# Patient Record
Sex: FEMALE | Race: WHITE
[De-identification: names, ages, dates, MRNs, and addresses within clinical notes are randomized per-mention and may not be internally consistent; named-entity substitution may affect disease eponyms.]

---

## 2017-10-15 ENCOUNTER — HOSPITAL ENCOUNTER (EMERGENCY)
Dept: HOSPITAL 62 - ER | Age: 82
Discharge: HOME | End: 2017-10-15
Payer: MEDICARE

## 2017-10-15 VITALS — DIASTOLIC BLOOD PRESSURE: 72 MMHG | SYSTOLIC BLOOD PRESSURE: 109 MMHG

## 2017-10-15 DIAGNOSIS — F41.9: Primary | ICD-10-CM

## 2017-10-15 DIAGNOSIS — R53.81: ICD-10-CM

## 2017-10-15 DIAGNOSIS — E78.00: ICD-10-CM

## 2017-10-15 DIAGNOSIS — Z88.6: ICD-10-CM

## 2017-10-15 DIAGNOSIS — R53.1: ICD-10-CM

## 2017-10-15 DIAGNOSIS — M19.90: ICD-10-CM

## 2017-10-15 DIAGNOSIS — E11.9: ICD-10-CM

## 2017-10-15 DIAGNOSIS — E87.6: ICD-10-CM

## 2017-10-15 DIAGNOSIS — I10: ICD-10-CM

## 2017-10-15 DIAGNOSIS — Z88.2: ICD-10-CM

## 2017-10-15 LAB
ALBUMIN SERPL-MCNC: 4 G/DL (ref 3.5–5)
ALP SERPL-CCNC: 67 U/L (ref 38–126)
ALT SERPL-CCNC: 26 U/L (ref 9–52)
ANION GAP SERPL CALC-SCNC: 14 MMOL/L (ref 5–19)
APPEARANCE UR: CLEAR
AST SERPL-CCNC: 38 U/L (ref 14–36)
BASOPHILS # BLD AUTO: 0 10^3/UL (ref 0–0.2)
BASOPHILS NFR BLD AUTO: 0.4 % (ref 0–2)
BILIRUB DIRECT SERPL-MCNC: 0.4 MG/DL (ref 0–0.4)
BILIRUB SERPL-MCNC: 0.9 MG/DL (ref 0.2–1.3)
BILIRUB UR QL STRIP: NEGATIVE
BUN SERPL-MCNC: 25 MG/DL (ref 7–20)
CALCIUM: 9.7 MG/DL (ref 8.4–10.2)
CHLORIDE SERPL-SCNC: 95 MMOL/L (ref 98–107)
CO2 SERPL-SCNC: 35 MMOL/L (ref 22–30)
CREAT SERPL-MCNC: 1.25 MG/DL (ref 0.52–1.25)
EOSINOPHIL # BLD AUTO: 0 10^3/UL (ref 0–0.6)
EOSINOPHIL NFR BLD AUTO: 0.5 % (ref 0–6)
ERYTHROCYTE [DISTWIDTH] IN BLOOD BY AUTOMATED COUNT: 13.5 % (ref 11.5–14)
GLUCOSE SERPL-MCNC: 97 MG/DL (ref 75–110)
GLUCOSE UR STRIP-MCNC: NEGATIVE MG/DL
HCT VFR BLD CALC: 38 % (ref 36–47)
HGB BLD-MCNC: 13.4 G/DL (ref 12–15.5)
HGB HCT DIFFERENCE: 2.2
KETONES UR STRIP-MCNC: NEGATIVE MG/DL
LYMPHOCYTES # BLD AUTO: 1.7 10^3/UL (ref 0.5–4.7)
LYMPHOCYTES NFR BLD AUTO: 24.3 % (ref 13–45)
MCH RBC QN AUTO: 31.5 PG (ref 27–33.4)
MCHC RBC AUTO-ENTMCNC: 35.3 G/DL (ref 32–36)
MCV RBC AUTO: 89 FL (ref 80–97)
MONOCYTES # BLD AUTO: 0.3 10^3/UL (ref 0.1–1.4)
MONOCYTES NFR BLD AUTO: 5 % (ref 3–13)
NEUTROPHILS # BLD AUTO: 4.8 10^3/UL (ref 1.7–8.2)
NEUTS SEG NFR BLD AUTO: 69.8 % (ref 42–78)
NITRITE UR QL STRIP: NEGATIVE
PH UR STRIP: 5 [PH] (ref 5–9)
POTASSIUM SERPL-SCNC: 3.1 MMOL/L (ref 3.6–5)
PROT SERPL-MCNC: 6.9 G/DL (ref 6.3–8.2)
PROT UR STRIP-MCNC: NEGATIVE MG/DL
RBC # BLD AUTO: 4.26 10^6/UL (ref 3.72–5.28)
SODIUM SERPL-SCNC: 143.7 MMOL/L (ref 137–145)
SP GR UR STRIP: 1
UROBILINOGEN UR-MCNC: NEGATIVE MG/DL (ref ?–2)
WBC # BLD AUTO: 6.9 10^3/UL (ref 4–10.5)

## 2017-10-15 PROCEDURE — 81001 URINALYSIS AUTO W/SCOPE: CPT

## 2017-10-15 PROCEDURE — 99284 EMERGENCY DEPT VISIT MOD MDM: CPT

## 2017-10-15 PROCEDURE — 83735 ASSAY OF MAGNESIUM: CPT

## 2017-10-15 PROCEDURE — 36415 COLL VENOUS BLD VENIPUNCTURE: CPT

## 2017-10-15 PROCEDURE — 84443 ASSAY THYROID STIM HORMONE: CPT

## 2017-10-15 PROCEDURE — 80053 COMPREHEN METABOLIC PANEL: CPT

## 2017-10-15 PROCEDURE — 85025 COMPLETE CBC W/AUTO DIFF WBC: CPT

## 2017-10-15 NOTE — ER DOCUMENT REPORT
ED General





- General


Mode of Arrival: Ambulatory - with walker


Information source: Patient


TRAVEL OUTSIDE OF THE [place] IN LAST 30 DAYS: No





- HPI


Associated symptoms: Other - see HPI





<LYDIA DURAND - Last Filed: 10/15/17 12:11>





<TUNG NEELY - Last Filed: 11/23/17 06:33>





- General


Chief Complaint: Weakness


Stated Complaint: WEAKNESS


Time Seen by Provider: 10/15/17 11:51


Notes: 


Patient is an 82 year old female who presents to the ED with complaints of 

worsening arthritic pain, decline in ability to do daily activities without 

assistance and increased forgetfulness.  This has all been declining over the 

last several months.  She also complains of "shaking in her chest" and anxiety.

  Her PCP is aware of these symptoms and her next appointment with him is in 

November. 


 (LYDIA DURAND)





- Related Data


Allergies/Adverse Reactions: 


 





codeine [Codeine] Allergy (Verified 10/15/17 10:38)


 


doxycycline [Doxycycline] Allergy (Verified 10/15/17 10:38)


 


Sulfa (Sulfonamide Antibiotics) Allergy (Verified 10/15/17 10:38)


 








Home Medications: 


 Current Home Medications





Esomeprazole Magnesium [Nexium] 40 mg PO DAILY 10/15/17 [History]


Hydrochlorothiazide [Hydrochlorothiazide] 25 mg PO DAILY 10/15/17 [History]


Olopatadine HCl [Pataday] 1 drop OU DAILY 10/15/17 [History]


Risperidone [Risperdal] 0.5 mg PO BID 10/15/17 [History]


Rosuvastatin Calcium [Rosuvastatin Calcium] 20 mg PO DAILY 10/15/17 [History]











Past Medical History





- General


Information source: Patient





- Social History


Smoking Status: Never Smoker


Chew tobacco use (# tins/day): No


Frequency of alcohol use: None


Drug Abuse: None


Family History: Reviewed & Not Pertinent





- Past Medical History


Cardiac Medical History: Reports: Hx Hypercholesterolemia, Hx Hypertension


Pulmonary Medical History: Reports: Hx Asthma


Endocrine Medical History: Reports: Hx Diabetes Mellitus Type 2


Renal/ Medical History: Denies: Hx Peritoneal Dialysis


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


Psychiatric Medical History: Reports: Hx Anxiety





- Immunizations


Hx Diphtheria, Pertussis, Tetanus Vaccination: Yes





<LYDIA DURAND - Last Filed: 10/15/17 12:11>





Review of Systems





- Review of Systems


Constitutional: See HPI, Weakness


EENT: No symptoms reported


Cardiovascular: No symptoms reported


Respiratory: No symptoms reported


Gastrointestinal: No symptoms reported


Genitourinary: No symptoms reported


Female Genitourinary: No symptoms reported


Musculoskeletal: See HPI, Other - worsening arthritic pain


Skin: No symptoms reported


Hematologic/Lymphatic: No symptoms reported


Neurological/Psychological: See HPI, Anxiety, Weakness, Other - forgetful





<LYDIA DURAND - Last Filed: 10/15/17 12:11>





Physical Exam





- General


General appearance: Appears well, Alert, Other - expressionless face


In distress: None





- HEENT


Head: Normocephalic, Atraumatic


Eyes: Normal, Tears - right eye


Extraocular movements intact: Yes


Pupils: PERRL





- Respiratory


Respiratory status: No respiratory distress


Breath sounds: Normal





- Cardiovascular


Rhythm: Regular


Heart sounds: Normal auscultation


Murmur: No





- Abdominal


Inspection: Normal


Distension: No distension


Tenderness: Nontender





- Back


Back: Normal





- Extremities


General upper extremity: Normal inspection, Normal strength


General lower extremity: Normal inspection, Normal strength





- Neurological


Neuro grossly intact: Yes





- Psychological


Associated symptoms: Normal affect, Normal mood





- Skin


Skin Temperature: Warm


Skin Moisture: Dry


Skin Color: Normal





<LYDIA DURAND - Last Filed: 10/15/17 12:11>





- Vital signs


Vitals: 


 











Temp Pulse Resp BP Pulse Ox


 


 98.4 F   89   16   109/72   94 


 


 10/15/17 10:38  10/15/17 10:38  10/15/17 10:38  10/15/17 10:38  10/15/17 10:38














Course





- Laboratory


Result Diagrams: 


 10/15/17 13:02





 10/15/17 13:02





<TUNG NEELY - Last Filed: 11/23/17 06:33>





- Vital Signs


Vital signs: 


 











Temp Pulse Resp BP Pulse Ox


 


 98.4 F   89   16   109/72   94 


 


 10/15/17 10:38  10/15/17 10:38  10/15/17 10:38  10/15/17 10:38  10/15/17 10:38














- Laboratory


Laboratory results interpreted by me: 


 











  10/15/17 10/15/17 10/15/17





  13:02 13:02 13:02


 


Plt Count  147 L  


 


Potassium   3.1 L 


 


Chloride   95 L 


 


Carbon Dioxide   35 H 


 


BUN   25 H 


 


Est GFR ( Amer)   50 L 


 


Est GFR (Non-Af Amer)   41 L 


 


Magnesium   


 


AST   38 H 


 


Urine Blood    SMALL H














  10/15/17





  13:02


 


Plt Count 


 


Potassium 


 


Chloride 


 


Carbon Dioxide 


 


BUN 


 


Est GFR ( Amer) 


 


Est GFR (Non-Af Amer) 


 


Magnesium  1.5 L


 


AST 


 


Urine Blood 














Discharge





<LYDIA DURAND - Last Filed: 10/15/17 12:11>





<TUNG NEELY - Last Filed: 11/23/17 06:33>





- Discharge


Clinical Impression: 


 Anxiety, Arthritis, Hypokalemia, Declining functional status





Condition: Stable


Disposition: HOME, SELF-CARE


Additional Instructions: 


There was no sign of any infection on your workup today.


You did have a low potassium level.


You should increase potassium in your diet, eating things such as bananas.


Your serum carbon dioxide levels were increased compared to the lab work I have 

to review from 2 years ago.  


      I cannot tell if this is a new finding without recent lab work for 

comparison.





You should follow-up with Dr. Arenas in the office tomorrow for reevaluation 

of your lab work, and your functional status decline which has been occurring 

over the last several weeks to months.





RETURN TO THE EMERGENCY ROOM IF ANY NEW OR WORSENING SYMPTOMS.








Referrals: 


MAYNOR ARENAS MD [Primary Care Provider] - Follow up tomorrow


Scribe Attestation: 





10/15/17 14:35


I personally performed the services described in the documentation, reviewed 

and edited the documentation which was dictated to the scribe in my presence, 

and it accurately records my words and actions. (TUNG NEELY)





Scribe Documentation





- Scribe


Written by Hermes:: hermes Justin, 10/15/17, 1212


acting as scribe for :: Stacey





<LYDIA DURAND - Last Filed: 10/15/17 12:11>

## 2018-01-21 ENCOUNTER — HOSPITAL ENCOUNTER (INPATIENT)
Dept: HOSPITAL 62 - ER | Age: 83
LOS: 5 days | Discharge: HOME HEALTH | DRG: 392 | End: 2018-01-26
Attending: INTERNAL MEDICINE | Admitting: INTERNAL MEDICINE
Payer: MEDICARE

## 2018-01-21 DIAGNOSIS — G30.1: ICD-10-CM

## 2018-01-21 DIAGNOSIS — Z88.8: ICD-10-CM

## 2018-01-21 DIAGNOSIS — J45.909: ICD-10-CM

## 2018-01-21 DIAGNOSIS — F02.80: ICD-10-CM

## 2018-01-21 DIAGNOSIS — R13.10: Primary | ICD-10-CM

## 2018-01-21 DIAGNOSIS — Z88.2: ICD-10-CM

## 2018-01-21 DIAGNOSIS — F41.9: ICD-10-CM

## 2018-01-21 DIAGNOSIS — Z88.1: ICD-10-CM

## 2018-01-21 DIAGNOSIS — Z16.12: ICD-10-CM

## 2018-01-21 DIAGNOSIS — E78.5: ICD-10-CM

## 2018-01-21 DIAGNOSIS — N39.0: ICD-10-CM

## 2018-01-21 DIAGNOSIS — M19.90: ICD-10-CM

## 2018-01-21 DIAGNOSIS — E87.6: ICD-10-CM

## 2018-01-21 DIAGNOSIS — B96.20: ICD-10-CM

## 2018-01-21 DIAGNOSIS — I10: ICD-10-CM

## 2018-01-21 DIAGNOSIS — Z79.899: ICD-10-CM

## 2018-01-21 DIAGNOSIS — G89.4: ICD-10-CM

## 2018-01-21 DIAGNOSIS — D64.9: ICD-10-CM

## 2018-01-21 DIAGNOSIS — E11.9: ICD-10-CM

## 2018-01-21 LAB
ADD MANUAL DIFF: NO
ALBUMIN SERPL-MCNC: 3.6 G/DL (ref 3.5–5)
ALP SERPL-CCNC: 55 U/L (ref 38–126)
ALT SERPL-CCNC: 32 U/L (ref 9–52)
ANION GAP SERPL CALC-SCNC: 10 MMOL/L (ref 5–19)
APPEARANCE UR: CLEAR
APTT PPP: (no result) S
AST SERPL-CCNC: 57 U/L (ref 14–36)
BASOPHILS # BLD AUTO: 0 10^3/UL (ref 0–0.2)
BASOPHILS NFR BLD AUTO: 0.7 % (ref 0–2)
BILIRUB DIRECT SERPL-MCNC: 0.4 MG/DL (ref 0–0.4)
BILIRUB SERPL-MCNC: 0.8 MG/DL (ref 0.2–1.3)
BILIRUB UR QL STRIP: NEGATIVE
BUN SERPL-MCNC: 31 MG/DL (ref 7–20)
CALCIUM: 9.4 MG/DL (ref 8.4–10.2)
CHLORIDE SERPL-SCNC: 93 MMOL/L (ref 98–107)
CO2 SERPL-SCNC: 34 MMOL/L (ref 22–30)
EOSINOPHIL # BLD AUTO: 0.1 10^3/UL (ref 0–0.6)
EOSINOPHIL NFR BLD AUTO: 1.4 % (ref 0–6)
ERYTHROCYTE [DISTWIDTH] IN BLOOD BY AUTOMATED COUNT: 14.5 % (ref 11.5–14)
GLUCOSE SERPL-MCNC: 119 MG/DL (ref 75–110)
GLUCOSE UR STRIP-MCNC: NEGATIVE MG/DL
HCT VFR BLD CALC: 34.1 % (ref 36–47)
HGB BLD-MCNC: 11.7 G/DL (ref 12–15.5)
KETONES UR STRIP-MCNC: NEGATIVE MG/DL
LYMPHOCYTES # BLD AUTO: 1.2 10^3/UL (ref 0.5–4.7)
LYMPHOCYTES NFR BLD AUTO: 21.5 % (ref 13–45)
MCH RBC QN AUTO: 30.5 PG (ref 27–33.4)
MCHC RBC AUTO-ENTMCNC: 34.4 G/DL (ref 32–36)
MCV RBC AUTO: 89 FL (ref 80–97)
MONOCYTES # BLD AUTO: 0.3 10^3/UL (ref 0.1–1.4)
MONOCYTES NFR BLD AUTO: 5.9 % (ref 3–13)
NEUTROPHILS # BLD AUTO: 3.9 10^3/UL (ref 1.7–8.2)
NEUTS SEG NFR BLD AUTO: 70.5 % (ref 42–78)
NITRITE UR QL STRIP: NEGATIVE
PH UR STRIP: 5 [PH] (ref 5–9)
PLATELET # BLD: 202 10^3/UL (ref 150–450)
POTASSIUM SERPL-SCNC: 2.8 MMOL/L (ref 3.6–5)
PROT SERPL-MCNC: 6.3 G/DL (ref 6.3–8.2)
PROT UR STRIP-MCNC: NEGATIVE MG/DL
RBC # BLD AUTO: 3.85 10^6/UL (ref 3.72–5.28)
SODIUM SERPL-SCNC: 136.8 MMOL/L (ref 137–145)
SP GR UR STRIP: 1
TOTAL CELLS COUNTED % (AUTO): 100 %
UROBILINOGEN UR-MCNC: NEGATIVE MG/DL (ref ?–2)
WBC # BLD AUTO: 5.6 10^3/UL (ref 4–10.5)

## 2018-01-21 PROCEDURE — 96366 THER/PROPH/DIAG IV INF ADDON: CPT

## 2018-01-21 PROCEDURE — 80053 COMPREHEN METABOLIC PANEL: CPT

## 2018-01-21 PROCEDURE — 85025 COMPLETE CBC W/AUTO DIFF WBC: CPT

## 2018-01-21 PROCEDURE — 85027 COMPLETE CBC AUTOMATED: CPT

## 2018-01-21 PROCEDURE — 82565 ASSAY OF CREATININE: CPT

## 2018-01-21 PROCEDURE — 87088 URINE BACTERIA CULTURE: CPT

## 2018-01-21 PROCEDURE — 71046 X-RAY EXAM CHEST 2 VIEWS: CPT

## 2018-01-21 PROCEDURE — 36415 COLL VENOUS BLD VENIPUNCTURE: CPT

## 2018-01-21 PROCEDURE — 80048 BASIC METABOLIC PNL TOTAL CA: CPT

## 2018-01-21 PROCEDURE — 99285 EMERGENCY DEPT VISIT HI MDM: CPT

## 2018-01-21 PROCEDURE — 93010 ELECTROCARDIOGRAM REPORT: CPT

## 2018-01-21 PROCEDURE — 81001 URINALYSIS AUTO W/SCOPE: CPT

## 2018-01-21 PROCEDURE — 83735 ASSAY OF MAGNESIUM: CPT

## 2018-01-21 PROCEDURE — 96365 THER/PROPH/DIAG IV INF INIT: CPT

## 2018-01-21 PROCEDURE — 70450 CT HEAD/BRAIN W/O DYE: CPT

## 2018-01-21 PROCEDURE — 93005 ELECTROCARDIOGRAM TRACING: CPT

## 2018-01-21 PROCEDURE — 87086 URINE CULTURE/COLONY COUNT: CPT

## 2018-01-21 PROCEDURE — 87186 SC STD MICRODIL/AGAR DIL: CPT

## 2018-01-21 RX ADMIN — POTASSIUM CHLORIDE SCH ML: 29.8 INJECTION, SOLUTION INTRAVENOUS at 17:04

## 2018-01-21 RX ADMIN — HYDROCODONE BITARTRATE AND ACETAMINOPHEN PRN TAB: 5; 325 TABLET ORAL at 18:36

## 2018-01-21 RX ADMIN — POTASSIUM CHLORIDE SCH ML: 29.8 INJECTION, SOLUTION INTRAVENOUS at 14:49

## 2018-01-21 NOTE — RADIOLOGY REPORT (SQ)
EXAM DESCRIPTION:  CT HEAD WITHOUT



COMPLETED DATE/TIME:  1/21/2018 2:34 pm



REASON FOR STUDY:  Swallowing dysfunction



COMPARISON:  10/18/2015.



TECHNIQUE:  Axial images acquired through the brain without intravenous contrast.  Images reviewed wi
th bone, brain and subdural windows.  Images stored on PACS.

All CT scanners at this facility use dose modulation, iterative reconstruction, and/or weight based d
osing when appropriate to reduce radiation dose to as low as reasonably achievable (ALARA).

[facility]: Dose Right  [facility]: CareDose    [facility]: Dose Right    [facility]: Teradose 4D    [facility]: Smart Technologies



RADIATION DOSE:  CT Rad equipment meets quality standard of care and radiation dose reduction techniq
ues were employed. CTDIvol: 62.3 mGy. DLP: 1163 mGy-cm. mGy.



LIMITATIONS:  None.



FINDINGS:  VENTRICLES: Prominent.

CEREBRUM: No masses.  No hemorrhage.  No midline shift.  Areas of low density in the white matter mos
t likely due to chronic micro-vascular ischemic change.  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  No
 evidence for acute infarction.

CEREBELLUM: No masses.  No hemorrhage.  No alteration of density.  No evidence for acute infarction.

EXTRAAXIAL SPACES: Mild age-related involutional change.  No fluid collections.  No masses.

ORBITS AND GLOBE: No intra- or extraconal masses.  Normal contour of globe without masses.

CALVARIUM: No fracture.

PARANASAL SINUSES: Mucous membrane thickening in the left maxillary sinus.  No fluid.

SOFT TISSUES: No mass or hematoma.

OTHER: No other significant finding.



IMPRESSION:  MILD CHRONIC CHANGES OF ATROPHY AND MICROVASCULAR ISCHEMIA.  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
CIPITAL LOBE.  CHRONIC SINUS DISEASE.  NO ACUTE PROCESS.

EVIDENCE OF ACUTE STROKE: NO.



TECHNICAL DOCUMENTATION:  JOB ID:  [id number]

Quality ID # [id number]: Final reports with documentation of one or more dose reduction techniques (e.g., Au
tomated exposure control, adjustment of the mA and/or kV according to patient size, use of iterative 
reconstruction technique)

 2011 [employer]- All Rights Reserved

## 2018-01-21 NOTE — ER DOCUMENT REPORT
ED Medical Screen (RME)





- General


Chief Complaint: Difficulty Swallowing


Stated Complaint: MOUTH PAIN


Time Seen by Provider: 01/21/18 12:11


Mode of Arrival: Ambulatory


Information source: Patient


Notes: 





This is an 82-year-old female who is brought to the emergency room for 

generalized weakness, drooling, not eating


TRAVEL OUTSIDE OF THE [place] IN LAST 30 DAYS: No





- Related Data


Allergies/Adverse Reactions: 


 





codeine [Codeine] Allergy (Verified 01/21/18 11:19)


 


doxycycline [Doxycycline] Allergy (Verified 01/21/18 11:19)


 


Sulfa (Sulfonamide Antibiotics) Allergy (Verified 01/21/18 11:19)


 











Past Medical History





- Social History


Frequency of alcohol use: None


Drug Abuse: None





- Past Medical History


Cardiac Medical History: Reports: Hx Hypercholesterolemia, Hx Hypertension


Pulmonary Medical History: Reports: Hx Asthma


Endocrine Medical History: Reports: Hx Diabetes Mellitus Type 2


Renal/ Medical History: Denies: Hx Peritoneal Dialysis


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


Psychiatric Medical History: Reports: Hx Anxiety





- Immunizations


Hx Diphtheria, Pertussis, Tetanus Vaccination: Yes


History of Influenza Vaccine for 10/2017 - 3/2018 Season: No





Physical Exam





- Vital signs


Vitals: 





 











Temp Pulse Resp BP Pulse Ox


 


 98.0 F   78   14   132/66 H  94 


 


 01/21/18 11:25  01/21/18 11:25  01/21/18 11:25  01/21/18 11:25  01/21/18 11:25














Course





- Vital Signs


Vital signs: 





 











Temp Pulse Resp BP Pulse Ox


 


 98.0 F   78   14   132/66 H  94 


 


 01/21/18 11:25  01/21/18 11:25  01/21/18 11:25  01/21/18 11:25  01/21/18 11:25

## 2018-01-21 NOTE — ER DOCUMENT REPORT
ED General





- General


Chief Complaint: Difficulty Swallowing


Stated Complaint: MOUTH PAIN


Time Seen by Provider: 01/21/18 12:11


Mode of Arrival: Ambulatory


TRAVEL OUTSIDE OF THE [place] IN LAST 30 DAYS: No





- HPI


Notes: 





82-year-old female who is brought to the emergency room for generalized weakness

, drooling, not eating.  She has a history of dementia hypokalemia and 

hypomagnesemia.  Initially there were varying opinions on when this started 

apparently it started mildly about a week ago but is much worse than the last 2 

days.  Patient states she drools can get some of her saliva down but is unable 

to eat or really drink anything.  She denies any mouth or throat pain.  She has 

difficulty describing why this is happening but states she thinks it might be 

related to "nerves".  She recently was started on a medication, Aricept, which 

she states she does not like to take.  Denies any other focal weakness numbness 

or tingling.  No visual change.  She has generalized weakness and is having 

increasing difficulty performing ADLs.  She does have caregivers that come to 

the house.  Primary care physician is Dr. Arenas.





- Related Data


Allergies/Adverse Reactions: 


 





codeine [Codeine] Allergy (Verified 01/21/18 11:19)


 


doxycycline [Doxycycline] Allergy (Verified 01/21/18 11:19)


 


Sulfa (Sulfonamide Antibiotics) Allergy (Verified 01/21/18 11:19)


 











Past Medical History





- General


Information source: Patient





- Social History


Smoking Status: Never Smoker


Frequency of alcohol use: None


Drug Abuse: None


Family History: Reviewed & Not Pertinent


Patient has suicidal ideation: No


Patient has homicidal ideation: No





- Past Medical History


Cardiac Medical History: Reports: Hx Hypercholesterolemia, Hx Hypertension


Pulmonary Medical History: Reports: Hx Asthma


Endocrine Medical History: Reports: Hx Diabetes Mellitus Type 2


Renal/ Medical History: Denies: Hx Peritoneal Dialysis


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


Psychiatric Medical History: Reports: Hx Anxiety





- Immunizations


Hx Diphtheria, Pertussis, Tetanus Vaccination: Yes





Review of Systems





- Review of Systems


-: Yes All other systems reviewed and negative





Physical Exam





- Vital signs


Vitals: 


 











Temp Pulse Resp BP Pulse Ox


 


 98.0 F   78   14   132/66 H  94 


 


 01/21/18 11:25  01/21/18 11:25  01/21/18 11:25  01/21/18 11:25  01/21/18 11:25











Notes: 





See nurse's note





- Notes


Notes: 





GENERAL: VS as per nursing doc. Well-appearing, well-nourished and in no acute 

distress.





HEAD: Atraumatic, normocephalic.





EYES: Pupils equal round and reactive to light, extraocular movements intact, 

sclera anicteric, no conjunctival injection or discharge.





ENT: Nares patent, there is no drooling noted.  Maintaining airway and 

secretions.  Dentures are noted in place.  Mucosal lesions are absent.  Moist 

mucous membranes.





NECK: Normal range of motion, supple, no carotid bruits.  No gross swallowing 

deficit.





LUNGS: Breath sounds clear to auscultation bilaterally and equal.  No wheezes 

rales or rhonchi.





HEART: Normal S1S2. Regular rate and rhythm without murmurs. Equal peripheral 

pulses.





ABDOMEN: Soft, non-tender.





EXTREMITIES: No significant range of motion limitations.  No edema.





NEUROLOGICAL: GCS 15, Cranial nerves II-XII intact. Normal speech without 

aphasia. No pronator drift.  Generalized weakness but symmetrical.   No 

cerebellar abnormalities no very slow rapid alternating movements.





PSYCH: Very flat affect noted.  No significant facial expression noted.  

Minimal smile obtained when directed.





SKIN: Warm, Dry, no cyanosis, Cap refill < 2 sec.











Course





- Re-evaluation


Re-evalutation: 





01/21/18 15:08


Though the patient is not had a lot of dribbling here, she is done poorly with 

swallowing.  Unclear if this is more psychiatric in nature but cannot rule out 

neurologic cause otherwise.  She is hypokalemic and as she is not taking p.o. 

well, she will need further IV replacement.  Dr. Arenas will admit for 

further evaluation and treatment.





- Vital Signs


Vital signs: 


 











Temp Pulse Resp BP Pulse Ox


 


 98.0 F   78   14   132/66 H  94 


 


 01/21/18 11:25  01/21/18 11:25  01/21/18 13:15  01/21/18 11:25  01/21/18 11:25














- Laboratory


Result Diagrams: 


 01/21/18 12:25





 01/21/18 12:25


Laboratory results interpreted by me: 


 











  01/21/18 01/21/18





  12:25 12:25


 


Hgb  11.7 L 


 


Hct  34.1 L 


 


RDW  14.5 H 


 


Sodium   136.8 L


 


Potassium   2.8 L*


 


Chloride   93 L


 


Carbon Dioxide   34 H


 


BUN   31 H


 


Est GFR ( Amer)   52 L


 


Est GFR (Non-Af Amer)   43 L


 


Glucose   119 H


 


AST   57 H














- EKG Interpretation by Me


EKG shows normal: Sinus rhythm - Heart rate 63, right bundle branch 

configuration, low amplitude P waves are present.  Artifact noted.





- Consults


  ** Sang


Time consulted: 15:07


Consulted provider: will see as inpatient





Discharge





- Discharge


Clinical Impression: 


 Dysphasia, Hypokalemia, Weakness





Condition: Fair


Disposition: ADMITTED AS OBSERVATION


Admitting Provider: Sang


Unit Admitted: Telemetry


Referrals: 


MAYNOR ARENAS MD [Primary Care Provider] - Follow up as needed

## 2018-01-21 NOTE — EKG REPORT
SEVERITY:- ABNORMAL ECG -

SINUS RHYTHM

BLOCKED APCs

RIGHT BUNDLE BRANCH BLOCK

:

Confirmed by: Jaden Bae 21-Jan-2018 19:56:47

## 2018-01-21 NOTE — RADIOLOGY REPORT (SQ)
EXAM DESCRIPTION:  CHEST PA/LAT



COMPLETED DATE/TIME:  1/21/2018 1:12 pm



REASON FOR STUDY:  weakness



COMPARISON:  10/18/2015



EXAM PARAMETERS:  NUMBER OF VIEWS: two views

TECHNIQUE: Digital Frontal and Lateral radiographic views of the chest acquired.

RADIATION DOSE: NA

LIMITATIONS: none



FINDINGS:  LUNGS AND PLEURA: No opacities, masses or pneumothorax. No pleural effusion.

MEDIASTINUM AND HILAR STRUCTURES: No masses or contour abnormalities.

HEART AND VASCULAR STRUCTURES: Heart normal size.  No evidence for failure.

BONES: No acute findings.

HARDWARE: None in the chest.

OTHER: No other significant finding.



IMPRESSION:  NO ACUTE CARDIOPULMONARY PROCESS.  NO SIGNIFICANT CHANGE FROM PRIOR STUDY.



TECHNICAL DOCUMENTATION:  JOB ID:  [id number]

 2011 [employer]- All Rights Reserved

## 2018-01-22 LAB
ANION GAP SERPL CALC-SCNC: 8 MMOL/L (ref 5–19)
BUN SERPL-MCNC: 20 MG/DL (ref 7–20)
CALCIUM: 9.1 MG/DL (ref 8.4–10.2)
CHLORIDE SERPL-SCNC: 98 MMOL/L (ref 98–107)
CO2 SERPL-SCNC: 35 MMOL/L (ref 22–30)
ERYTHROCYTE [DISTWIDTH] IN BLOOD BY AUTOMATED COUNT: 14.5 % (ref 11.5–14)
GLUCOSE SERPL-MCNC: 94 MG/DL (ref 75–110)
HCT VFR BLD CALC: 34.4 % (ref 36–47)
HGB BLD-MCNC: 11.8 G/DL (ref 12–15.5)
MAGNESIUM SERPL-MCNC: 1.6 MG/DL (ref 1.6–2.3)
MCH RBC QN AUTO: 30.5 PG (ref 27–33.4)
MCHC RBC AUTO-ENTMCNC: 34.3 G/DL (ref 32–36)
MCV RBC AUTO: 89 FL (ref 80–97)
PLATELET # BLD: 197 10^3/UL (ref 150–450)
POTASSIUM SERPL-SCNC: 2.5 MMOL/L (ref 3.6–5)
RBC # BLD AUTO: 3.87 10^6/UL (ref 3.72–5.28)
SODIUM SERPL-SCNC: 140.9 MMOL/L (ref 137–145)
WBC # BLD AUTO: 5.4 10^3/UL (ref 4–10.5)

## 2018-01-22 RX ADMIN — POTASSIUM CHLORIDE SCH ML: 29.8 INJECTION, SOLUTION INTRAVENOUS at 19:17

## 2018-01-22 RX ADMIN — HYDROCODONE BITARTRATE AND ACETAMINOPHEN PRN TAB: 5; 325 TABLET ORAL at 03:52

## 2018-01-22 RX ADMIN — ALPRAZOLAM SCH MG: 0.5 TABLET ORAL at 17:05

## 2018-01-22 RX ADMIN — POTASSIUM CHLORIDE SCH ML: 29.8 INJECTION, SOLUTION INTRAVENOUS at 16:56

## 2018-01-22 RX ADMIN — ATORVASTATIN CALCIUM SCH MG: 40 TABLET, FILM COATED ORAL at 22:48

## 2018-01-22 RX ADMIN — RISPERIDONE SCH MG: 1 TABLET ORAL at 22:48

## 2018-01-22 RX ADMIN — DONEPEZIL HYDROCHLORIDE SCH MG: 5 TABLET, FILM COATED ORAL at 22:48

## 2018-01-22 RX ADMIN — HYDROCODONE BITARTRATE AND ACETAMINOPHEN PRN TAB: 5; 325 TABLET ORAL at 16:29

## 2018-01-22 RX ADMIN — CYCLOSPORINE SCH DROP: 0.5 EMULSION OPHTHALMIC at 22:48

## 2018-01-22 NOTE — PDOC H&P
History of Present Illness


Admission Date/PCP: 


  01/21/18 15:27





  MAYNOR MARYANNCOLIN





Patient complains of: Difficulty with swallowing with drooling


History of Present Illness: 


ROHIT WEATHERS is a 82 year old female known to my practice who was brought to 

the ED by family due to onset of difficulty with swallowing at breakfast prior 

to her presentation. There was associated drooling. Patient denied any definite 

sore throat, choking feeling or prior intake of any unusual food or drink. 

Family reported that her drooling has been progressive over 2 days preceding 

her presentation. She was recently diagnosed with significant hypokalemia for 

which she was treated with oral potassium supplementation. Family reported 

generalized weakness but she recently start home physical therapy program. Her 

morbidities include Hypertension, Hyperlipidemia, Asthma, Diabetes Mellitus 

Type 2, Osteoarthritis with chronic pain syndrome, Senile dementia, and Anxiety.





Past Medical History


Cardiac Medical History: Reports: Hyperlipidema, Hypertension


Pulmonary Medical History: Reports: Asthma


Endocrine Medical History: Reports: Diabetes Mellitus Type 2


Musculoskeltal Medical History: Reports: Arthritis - KNEES


Psychiatric Medical History: Reports: Depression





Social History


Smoking Status: Never Smoker





Family History


Family History: Reviewed & Not Pertinent


Parental Family History Reviewed: Yes


Children Family History Reviewed: Yes


Sibling(s) Family History Reviewed.: Yes





Medication/Allergy


Home Medications: 








Alprazolam [Xanax 0.5 mg Tablet] 0.5 mg PO BID 01/22/18 


Alprazolam [Xanax 0.5 mg Tablet] 0.5 mg PO DAILYP PRN 01/22/18 


Cyclosporine [Restasis] 1 each OU Q12 01/22/18 


Donepezil HCl [Aricept 5 mg Tablet] 5 mg PO QHS 01/22/18 


Esomeprazole Magnesium [Nexium] 40 mg PO QAM 01/22/18 


Hydrochlorothiazide [Hydrodiuril 12.5 mg Capsule] 12.5 mg PO DAILY 01/22/18 


Hydrocodone/Acetaminophen [Hydrocodon-Acetaminophen 5-325] 1 each PO Q6HP PRN 01 /22/18 


Magnesium Oxide [Mag-Ox 400 mg Tablet] 400 mg PO DAILY 01/22/18 


Potassium Chloride 10 meq PO DAILY 01/22/18 


Risperidone [Risperdal] 0.5 mg PO Q12 01/22/18 


Rosuvastatin Calcium [Crestor 20 mg Tablet] 20 mg PO DAILY 01/22/18 


Verapamil HCl [Verapamil ER] 240 mg PO DAILY 01/22/18 








Allergies/Adverse Reactions: 


 





codeine [Codeine] Allergy (Verified 01/21/18 11:19)


 


doxycycline [Doxycycline] Allergy (Verified 01/21/18 11:19)


 


Sulfa (Sulfonamide Antibiotics) Allergy (Verified 01/21/18 11:19)


 











Review of Systems


All systems: reviewed and no additional remarkable complaints except as stated





Physical Exam


Vital Signs: 


 











Temp Pulse Resp BP Pulse Ox


 


 98.1 F   78   11 L  137/59 H  98 


 


 01/22/18 02:41  01/21/18 11:25  01/22/18 07:00  01/22/18 05:01  01/22/18 07:00











General appearance: PRESENT: no acute distress


Head exam: PRESENT: atraumatic, normocephalic


Eye exam: PRESENT: conjunctiva pink, EOMI, PERRLA.  ABSENT: scleral icterus


Mouth exam: PRESENT: moist, neck supple


Respiratory exam: PRESENT: clear to auscultation joycelyn, decreased breath sounds - 

at lung bases


Cardiovascular exam: PRESENT: RRR.  ABSENT: diastolic murmur, rubs, systolic 

murmur


Vascular exam: PRESENT: normal capillary refill.  ABSENT: pallor


GI/Abdominal exam: PRESENT: normal bowel sounds, soft.  ABSENT: distended, 

guarding, mass, organolmegaly, rebound, tenderness


Rectal exam: PRESENT: deferred


Extremities exam: ABSENT: pedal edema


Musculoskeletal exam: PRESENT: normal inspection


Neurological exam: PRESENT: alert, awake, CN II-XII grossly intact, motor 

sensory deficit


Psychiatric exam: PRESENT: appropriate affect, normal mood.  ABSENT: homicidal 

ideation, suicidal ideation


Skin exam: PRESENT: dry, intact, warm.  ABSENT: cyanosis, rash





Results


Laboratory Results: 


 





 01/22/18 04:41 





 











  01/21/18 01/22/18





  16:41 04:41


 


Sodium   140.9


 


Potassium   2.5 L*


 


Chloride   98


 


Carbon Dioxide   35 H


 


Anion Gap   8


 


BUN   20


 


Creatinine   1.00


 


Est GFR ( Amer)   > 60


 


Est GFR (Non-Af Amer)   53 L


 


Glucose   94


 


Calcium   9.1


 


Magnesium   1.6


 


Urine Color  STRAW 


 


Urine Appearance  CLEAR 


 


Urine pH  5.0 


 


Ur Specific Gravity  1.005 


 


Urine Protein  NEGATIVE 


 


Urine Glucose (UA)  NEGATIVE 


 


Urine Ketones  NEGATIVE 


 


Urine Blood  SMALL H 


 


Urine Nitrite  NEGATIVE 


 


Ur Leukocyte Esterase  NEGATIVE 


 


Urine WBC (Auto)  1 


 


Urine RBC (Auto)  0 











Impressions: 


 





Chest X-Ray  01/21/18 12:11


IMPRESSION:  NO ACUTE CARDIOPULMONARY PROCESS.  NO SIGNIFICANT CHANGE FROM 

PRIOR STUDY.


 








Head CT  01/21/18 13:40


IMPRESSION:  MILD CHRONIC CHANGES OF ATROPHY AND MICROVASCULAR ISCHEMIA.  OLD 

INFARCT IN THE RIGHT OCCIPITAL LOBE.  CHRONIC SINUS DISEASE.  NO ACUTE PROCESS.


EVIDENCE OF ACUTE STROKE: NO.


 














Assessment & Plan





- Diagnosis


(1) Dysphagia


Qualifiers: 


   Dysphagia type: unspecified   Qualified Code(s): R13.10 - Dysphagia, 

unspecified   


Is this a current diagnosis for this admission?: Yes   


Plan: 


See admitting attending physician orders. May be related to her old stroke with 

residual motor deficit. Other contender include her dementia.








(2) Hypokalemia


Is this a current diagnosis for this admission?: Yes   


Plan: 


See admitting attending physician orders. Most likely due to inadequate intake 

and GI possible losses.








(3) HTN (hypertension)


Qualifiers: 


   Hypertension type: essential hypertension   Qualified Code(s): I10 - 

Essential (primary) hypertension   


Is this a current diagnosis for this admission?: Yes   


Plan: 


See admitting attending physician orders.








(4) HLD (hyperlipidemia)


Qualifiers: 


   Hyperlipidemia type: pure hypercholesterolemia   Qualified Code(s): E78.00 - 

Pure hypercholesterolemia, unspecified; E78.0 - Pure hypercholesterolemia   


Is this a current diagnosis for this admission?: Yes   


Plan: 


See admitting attending physician orders.








(5) Diabetes type 2, controlled


Qualifiers: 


   Diabetes mellitus complication status: without complication   Diabetes 

mellitus long term insulin use: without long term use   Qualified Code(s): 

E11.9 - Type 2 diabetes mellitus without complications   


Is this a current diagnosis for this admission?: Yes   


Plan: 


See admitting attending physician orders.








(6) Senile dementia of Alzheimer's type


Is this a current diagnosis for this admission?: Yes   


Plan: 


See admitting attending physician orders.








(7) Chronic pain syndrome


Is this a current diagnosis for this admission?: Yes   


Plan: 


See admitting attending physician orders.








(8) Osteoarthritis of multiple joints


Qualifiers: 


   Osteoarthritis type: primary   Qualified Code(s): M15.0 - Primary 

generalized (osteo)arthritis   


Is this a current diagnosis for this admission?: Yes   


Plan: 


See admitting attending physician orders.








- Time


Time Spent: 50 to 70 Minutes


Medications reviewed and adjusted accordingly: Yes


Anticipated discharge: Home with Homehealth


Within: within 48 hours





- Plan Summary


Plan Summary: 





See admitting attending physician orders.

## 2018-01-22 NOTE — PHYSICIAN ADVISORY NOTE
Physician Advisor ProgressNote


.: 


Pursuant to the  plan for [facility], I have reviewed the medical record 

for this patient.  





Physician Advisor Statement: 





Please consider documenting, if you agree:


1.  "Acute hypokalemia, worsening, suspect due to ______"  


2.  "Acute respiratory depression, suspect due to ________, with respiratory 

rate often 8-11"


3.  "Cerebrovascular atherosclerotic disease"  





4.  ? "Possible Dysphagia, suspect due to ______"


5.  ? "Major Depression"


6.  ? "Adverse effect of Aricept, causing ________"  


     (can cause nausea, anorexia, weight loss, fatigue, depression, somnolence, 

HA, muscle cramps among its common reactions per [facility])





7.  Medical Necessity:  for each day, please document what are clinical 

concerns requiring hospitalization & what is being done about them.











Status:  appropriately Obs status to start, given uncertain cause(s) of findings

, potential for quick recovery, & very low intensity of service so far.








Thanks!


CK

## 2018-01-23 LAB
ADD MANUAL DIFF: NO
ALBUMIN SERPL-MCNC: 3.3 G/DL (ref 3.5–5)
ALP SERPL-CCNC: 55 U/L (ref 38–126)
ALT SERPL-CCNC: 25 U/L (ref 9–52)
ANION GAP SERPL CALC-SCNC: 8 MMOL/L (ref 5–19)
APPEARANCE UR: CLEAR
APTT PPP: YELLOW S
AST SERPL-CCNC: 40 U/L (ref 14–36)
BASOPHILS # BLD AUTO: 0 10^3/UL (ref 0–0.2)
BASOPHILS NFR BLD AUTO: 0.7 % (ref 0–2)
BILIRUB DIRECT SERPL-MCNC: 0.4 MG/DL (ref 0–0.4)
BILIRUB SERPL-MCNC: 0.7 MG/DL (ref 0.2–1.3)
BILIRUB UR QL STRIP: NEGATIVE
BUN SERPL-MCNC: 17 MG/DL (ref 7–20)
CALCIUM: 9.2 MG/DL (ref 8.4–10.2)
CHLORIDE SERPL-SCNC: 98 MMOL/L (ref 98–107)
CO2 SERPL-SCNC: 35 MMOL/L (ref 22–30)
EOSINOPHIL # BLD AUTO: 0.1 10^3/UL (ref 0–0.6)
EOSINOPHIL NFR BLD AUTO: 2.8 % (ref 0–6)
ERYTHROCYTE [DISTWIDTH] IN BLOOD BY AUTOMATED COUNT: 14.8 % (ref 11.5–14)
GLUCOSE SERPL-MCNC: 94 MG/DL (ref 75–110)
GLUCOSE UR STRIP-MCNC: NEGATIVE MG/DL
HCT VFR BLD CALC: 32.7 % (ref 36–47)
HGB BLD-MCNC: 11.3 G/DL (ref 12–15.5)
KETONES UR STRIP-MCNC: NEGATIVE MG/DL
LYMPHOCYTES # BLD AUTO: 1.2 10^3/UL (ref 0.5–4.7)
LYMPHOCYTES NFR BLD AUTO: 25.8 % (ref 13–45)
MAGNESIUM SERPL-MCNC: 1.8 MG/DL (ref 1.6–2.3)
MCH RBC QN AUTO: 30.6 PG (ref 27–33.4)
MCHC RBC AUTO-ENTMCNC: 34.5 G/DL (ref 32–36)
MCV RBC AUTO: 89 FL (ref 80–97)
MONOCYTES # BLD AUTO: 0.4 10^3/UL (ref 0.1–1.4)
MONOCYTES NFR BLD AUTO: 8.3 % (ref 3–13)
NEUTROPHILS # BLD AUTO: 3 10^3/UL (ref 1.7–8.2)
NEUTS SEG NFR BLD AUTO: 62.4 % (ref 42–78)
NITRITE UR QL STRIP: NEGATIVE
PH UR STRIP: 8 [PH] (ref 5–9)
PLATELET # BLD: 197 10^3/UL (ref 150–450)
POTASSIUM SERPL-SCNC: 2.9 MMOL/L (ref 3.6–5)
PROT SERPL-MCNC: 6 G/DL (ref 6.3–8.2)
PROT UR STRIP-MCNC: NEGATIVE MG/DL
RBC # BLD AUTO: 3.68 10^6/UL (ref 3.72–5.28)
SODIUM SERPL-SCNC: 141.2 MMOL/L (ref 137–145)
SP GR UR STRIP: 1.01
TOTAL CELLS COUNTED % (AUTO): 100 %
UROBILINOGEN UR-MCNC: 4 MG/DL (ref ?–2)
WBC # BLD AUTO: 4.8 10^3/UL (ref 4–10.5)

## 2018-01-23 RX ADMIN — HYDROCODONE BITARTRATE AND ACETAMINOPHEN PRN TAB: 5; 325 TABLET ORAL at 22:55

## 2018-01-23 RX ADMIN — POTASSIUM CHLORIDE SCH MEQ: 750 TABLET, FILM COATED, EXTENDED RELEASE ORAL at 11:34

## 2018-01-23 RX ADMIN — RISPERIDONE SCH MG: 1 TABLET ORAL at 11:38

## 2018-01-23 RX ADMIN — HYDROCODONE BITARTRATE AND ACETAMINOPHEN PRN TAB: 5; 325 TABLET ORAL at 17:05

## 2018-01-23 RX ADMIN — CYCLOSPORINE SCH DROP: 0.5 EMULSION OPHTHALMIC at 22:55

## 2018-01-23 RX ADMIN — POTASSIUM CHLORIDE SCH ML: 29.8 INJECTION, SOLUTION INTRAVENOUS at 14:47

## 2018-01-23 RX ADMIN — DONEPEZIL HYDROCHLORIDE SCH MG: 5 TABLET, FILM COATED ORAL at 22:55

## 2018-01-23 RX ADMIN — VERAPAMIL HYDROCHLORIDE SCH MG: 240 TABLET, FILM COATED, EXTENDED RELEASE ORAL at 11:36

## 2018-01-23 RX ADMIN — ALPRAZOLAM SCH MG: 0.5 TABLET ORAL at 18:19

## 2018-01-23 RX ADMIN — RISPERIDONE SCH MG: 1 TABLET ORAL at 22:55

## 2018-01-23 RX ADMIN — CYCLOSPORINE SCH DROP: 0.5 EMULSION OPHTHALMIC at 11:37

## 2018-01-23 RX ADMIN — ALPRAZOLAM SCH MG: 0.5 TABLET ORAL at 11:33

## 2018-01-23 RX ADMIN — ENOXAPARIN SODIUM SCH MG: 40 INJECTION SUBCUTANEOUS at 11:38

## 2018-01-23 RX ADMIN — HYDROCODONE BITARTRATE AND ACETAMINOPHEN PRN TAB: 5; 325 TABLET ORAL at 06:56

## 2018-01-23 RX ADMIN — MAGNESIUM OXIDE TAB 400 MG (241.3 MG ELEMENTAL MG) SCH MG: 400 (241.3 MG) TAB at 11:25

## 2018-01-23 RX ADMIN — POTASSIUM CHLORIDE SCH ML: 29.8 INJECTION, SOLUTION INTRAVENOUS at 18:19

## 2018-01-23 RX ADMIN — ATORVASTATIN CALCIUM SCH MG: 40 TABLET, FILM COATED ORAL at 22:55

## 2018-01-23 RX ADMIN — POTASSIUM CHLORIDE SCH ML: 29.8 INJECTION, SOLUTION INTRAVENOUS at 11:38

## 2018-01-23 RX ADMIN — HYDROCODONE BITARTRATE AND ACETAMINOPHEN PRN TAB: 5; 325 TABLET ORAL at 01:17

## 2018-01-23 RX ADMIN — CEFTRIAXONE SODIUM SCH MG: 1 INJECTION, POWDER, FOR SOLUTION INTRAMUSCULAR; INTRAVENOUS at 22:55

## 2018-01-23 NOTE — PDOC PROGRESS REPORT
Subjective


Progress Note for:: 01/23/18


Subjective:: 





Patient reported difficulty with voiding and lower abdominal and vulval region 

pain. No nausea or vomiting. Tolerating oral feeding. No reported fever but 

patient claimed chills and requested several blanket usage. No chest pain or 

difficulty with breathing.


Reason For Visit: 


DYSPHASIA, PERSISTENT HYPOKALEMIA WITH FAILURE OF








Physical Exam


Vital Signs: 


 











Temp Pulse Resp BP Pulse Ox


 


 98.4 F   87   16   132/76 H  96 


 


 01/23/18 16:00  01/23/18 16:00  01/23/18 16:00  01/23/18 16:00  01/23/18 16:00








 Intake & Output











 01/22/18 01/23/18 01/24/18





 06:59 06:59 06:59


 


Intake Total  1062 2400


 


Output Total  900 


 


Balance  162 2400











General appearance: PRESENT: no acute distress


Head exam: PRESENT: atraumatic, normocephalic


Eye exam: PRESENT: conjunctiva pink, EOMI, PERRLA.  ABSENT: scleral icterus


Mouth exam: PRESENT: moist


Respiratory exam: PRESENT: clear to auscultation joycelyn, decreased breath sounds - 

at lung bases


Cardiovascular exam: PRESENT: RRR.  ABSENT: diastolic murmur, rubs, systolic 

murmur


GI/Abdominal exam: PRESENT: normal bowel sounds, soft, tenderness - suprapubic 

region.  ABSENT: ascites, mass


Extremities exam: PRESENT: pedal edema


Musculoskeletal exam: PRESENT: deformity - related to arthritis


Neurological exam: PRESENT: alert, awake - and appropriate in simple responses.


Psychiatric exam: PRESENT: appropriate affect, normal mood.  ABSENT: homicidal 

ideation, suicidal ideation


Skin exam: PRESENT: dry, intact, warm.  ABSENT: cyanosis, rash





Results


Laboratory Results: 


 





 01/23/18 07:02 





 01/23/18 07:02 





 











  01/23/18 01/23/18





  07:02 07:02


 


WBC  4.8 


 


RBC  3.68 L 


 


Hgb  11.3 L 


 


Hct  32.7 L 


 


MCV  89 


 


MCH  30.6 


 


MCHC  34.5 


 


RDW  14.8 H 


 


Plt Count  197 


 


Seg Neutrophils %  62.4 


 


Lymphocytes %  25.8 


 


Monocytes %  8.3 


 


Eosinophils %  2.8 


 


Basophils %  0.7 


 


Absolute Neutrophils  3.0 


 


Absolute Lymphocytes  1.2 


 


Absolute Monocytes  0.4 


 


Absolute Eosinophils  0.1 


 


Absolute Basophils  0.0 


 


Sodium   141.2


 


Potassium   2.9 L*


 


Chloride   98


 


Carbon Dioxide   35 H


 


Anion Gap   8


 


BUN   17


 


Creatinine   0.90


 


Est GFR ( Amer)   > 60


 


Est GFR (Non-Af Amer)   > 60


 


Glucose   94


 


Calcium   9.2


 


Magnesium   1.8


 


Total Bilirubin   0.7


 


AST   40 H


 


ALT   25


 


Alkaline Phosphatase   55


 


Total Protein   6.0 L


 


Albumin   3.3 L











Impressions: 


 





Chest X-Ray  01/21/18 12:11


IMPRESSION:  NO ACUTE CARDIOPULMONARY PROCESS.  NO SIGNIFICANT CHANGE FROM 

PRIOR STUDY.


 








Head CT  01/21/18 13:40


IMPRESSION:  MILD CHRONIC CHANGES OF ATROPHY AND MICROVASCULAR ISCHEMIA.  OLD 

INFARCT IN THE RIGHT OCCIPITAL LOBE.  CHRONIC SINUS DISEASE.  NO ACUTE PROCESS.


EVIDENCE OF ACUTE STROKE: NO.


 














Assessment & Plan





- Diagnosis


(1) Dysphagia


Qualifiers: 


   Dysphagia type: unspecified   Qualified Code(s): R13.10 - Dysphagia, 

unspecified   


Is this a current diagnosis for this admission?: Yes   


Plan: 


See attending physician orders.








(2) Hypokalemia


Is this a current diagnosis for this admission?: Yes   


Plan: 


Persistent despite administration f potassium supplementation. See attending 

physician orders.








(3) HTN (hypertension)


Qualifiers: 


   Hypertension type: essential hypertension   Qualified Code(s): I10 - 

Essential (primary) hypertension   


Is this a current diagnosis for this admission?: Yes   


Plan: 


See attending physician orders.








(4) HLD (hyperlipidemia)


Qualifiers: 


   Hyperlipidemia type: pure hypercholesterolemia   Qualified Code(s): E78.00 - 

Pure hypercholesterolemia, unspecified; E78.0 - Pure hypercholesterolemia   


Is this a current diagnosis for this admission?: Yes   


Plan: 


See attending physician orders.








(5) Diabetes type 2, controlled


Qualifiers: 


   Diabetes mellitus complication status: without complication   Diabetes 

mellitus long term insulin use: without long term use   Qualified Code(s): 

E11.9 - Type 2 diabetes mellitus without complications   


Is this a current diagnosis for this admission?: Yes   


Plan: 


See attending physician orders.








(6) Senile dementia of Alzheimer's type


Is this a current diagnosis for this admission?: Yes   


Plan: 


See attending physician orders.








(7) Chronic pain syndrome


Is this a current diagnosis for this admission?: Yes   


Plan: 


See attending physician orders.








(8) Osteoarthritis of multiple joints


Qualifiers: 


   Osteoarthritis type: primary   Qualified Code(s): M15.0 - Primary 

generalized (osteo)arthritis   


Is this a current diagnosis for this admission?: Yes   


Plan: 


See attending physician orders.








(9) Dysuria


Is this a current diagnosis for this admission?: Yes   


Plan: 


See attending physician orders.








- Time


Time Spent with patient: 25-34 minutes


Medications reviewed and adjusted accordingly: Yes


Anticipated discharge: Other


Within: Other





- Plan Summary


Plan Summary: 





See attending physician orders.

## 2018-01-24 LAB
ADD MANUAL DIFF: NO
ANION GAP SERPL CALC-SCNC: 6 MMOL/L (ref 5–19)
BASOPHILS # BLD AUTO: 0 10^3/UL (ref 0–0.2)
BASOPHILS NFR BLD AUTO: 0.6 % (ref 0–2)
BUN SERPL-MCNC: 16 MG/DL (ref 7–20)
CALCIUM: 8.8 MG/DL (ref 8.4–10.2)
CHLORIDE SERPL-SCNC: 102 MMOL/L (ref 98–107)
CO2 SERPL-SCNC: 33 MMOL/L (ref 22–30)
EOSINOPHIL # BLD AUTO: 0.2 10^3/UL (ref 0–0.6)
EOSINOPHIL NFR BLD AUTO: 3.2 % (ref 0–6)
ERYTHROCYTE [DISTWIDTH] IN BLOOD BY AUTOMATED COUNT: 14.7 % (ref 11.5–14)
GLUCOSE SERPL-MCNC: 85 MG/DL (ref 75–110)
HCT VFR BLD CALC: 29.9 % (ref 36–47)
HGB BLD-MCNC: 10.3 G/DL (ref 12–15.5)
LYMPHOCYTES # BLD AUTO: 1.3 10^3/UL (ref 0.5–4.7)
LYMPHOCYTES NFR BLD AUTO: 27.1 % (ref 13–45)
MCH RBC QN AUTO: 31.2 PG (ref 27–33.4)
MCHC RBC AUTO-ENTMCNC: 34.5 G/DL (ref 32–36)
MCV RBC AUTO: 90 FL (ref 80–97)
MONOCYTES # BLD AUTO: 0.4 10^3/UL (ref 0.1–1.4)
MONOCYTES NFR BLD AUTO: 7.8 % (ref 3–13)
NEUTROPHILS # BLD AUTO: 3 10^3/UL (ref 1.7–8.2)
NEUTS SEG NFR BLD AUTO: 61.3 % (ref 42–78)
PLATELET # BLD: 171 10^3/UL (ref 150–450)
POTASSIUM SERPL-SCNC: 3.4 MMOL/L (ref 3.6–5)
RBC # BLD AUTO: 3.32 10^6/UL (ref 3.72–5.28)
SODIUM SERPL-SCNC: 141.3 MMOL/L (ref 137–145)
TOTAL CELLS COUNTED % (AUTO): 100 %
WBC # BLD AUTO: 4.9 10^3/UL (ref 4–10.5)

## 2018-01-24 RX ADMIN — CYCLOSPORINE SCH DROP: 0.5 EMULSION OPHTHALMIC at 22:43

## 2018-01-24 RX ADMIN — POTASSIUM CHLORIDE SCH MEQ: 750 TABLET, FILM COATED, EXTENDED RELEASE ORAL at 10:00

## 2018-01-24 RX ADMIN — ALPRAZOLAM SCH MG: 0.5 TABLET ORAL at 18:56

## 2018-01-24 RX ADMIN — HYDROCODONE BITARTRATE AND ACETAMINOPHEN PRN TAB: 5; 325 TABLET ORAL at 16:07

## 2018-01-24 RX ADMIN — MAGNESIUM OXIDE TAB 400 MG (241.3 MG ELEMENTAL MG) SCH MG: 400 (241.3 MG) TAB at 10:00

## 2018-01-24 RX ADMIN — VERAPAMIL HYDROCHLORIDE SCH MG: 240 TABLET, FILM COATED, EXTENDED RELEASE ORAL at 10:03

## 2018-01-24 RX ADMIN — RISPERIDONE SCH MG: 1 TABLET ORAL at 10:03

## 2018-01-24 RX ADMIN — ENOXAPARIN SODIUM SCH MG: 40 INJECTION SUBCUTANEOUS at 10:03

## 2018-01-24 RX ADMIN — POTASSIUM CHLORIDE SCH ML: 29.8 INJECTION, SOLUTION INTRAVENOUS at 13:34

## 2018-01-24 RX ADMIN — POTASSIUM CHLORIDE SCH ML: 29.8 INJECTION, SOLUTION INTRAVENOUS at 16:10

## 2018-01-24 RX ADMIN — CEFTRIAXONE SODIUM SCH MG: 1 INJECTION, POWDER, FOR SOLUTION INTRAMUSCULAR; INTRAVENOUS at 22:43

## 2018-01-24 RX ADMIN — DONEPEZIL HYDROCHLORIDE SCH MG: 5 TABLET, FILM COATED ORAL at 22:43

## 2018-01-24 RX ADMIN — ALPRAZOLAM SCH MG: 0.5 TABLET ORAL at 10:00

## 2018-01-24 RX ADMIN — ATORVASTATIN CALCIUM SCH MG: 40 TABLET, FILM COATED ORAL at 22:42

## 2018-01-24 RX ADMIN — CYCLOSPORINE SCH DROP: 0.5 EMULSION OPHTHALMIC at 10:03

## 2018-01-24 RX ADMIN — HYDROCODONE BITARTRATE AND ACETAMINOPHEN PRN TAB: 5; 325 TABLET ORAL at 22:43

## 2018-01-24 RX ADMIN — RISPERIDONE SCH MG: 1 TABLET ORAL at 22:42

## 2018-01-24 RX ADMIN — POTASSIUM CHLORIDE SCH ML: 29.8 INJECTION, SOLUTION INTRAVENOUS at 10:08

## 2018-01-24 NOTE — PDOC PROGRESS REPORT
Subjective


Progress Note for:: 01/24/18


Subjective:: 





Patient denied chest pain, difficulty with breathing, nausea or vomiting. No 

reported fever of chills. Burning with urination persist.


Reason For Visit: 


DYSPHASIA, PERSISTENT HYPOKALEMIA WITH FAILURE OF








Physical Exam


Vital Signs: 


 











Temp Pulse Resp BP Pulse Ox


 


 97.8 F   59 L  16   149/70 H  99 


 


 01/24/18 03:30  01/24/18 03:30  01/24/18 03:30  01/24/18 03:30  01/24/18 03:30








 Intake & Output











 01/23/18 01/24/18 01/25/18





 06:59 06:59 06:59


 


Intake Total 1062 3331 


 


Output Total 900  


 


Balance 162 3331 











Physical Exam: 


General appearance: PRESENT: no acute distress


Head exam: PRESENT: atraumatic, normocephalic


Eye exam: PRESENT: conjunctiva pink, EOMI, PERRLA.  ABSENT: scleral icterus


Mouth exam: PRESENT: moist


Respiratory exam: PRESENT: clear to auscultation joycelyn, decreased breath sounds - 

at lung bases


Cardiovascular exam: PRESENT: RRR.  ABSENT: diastolic murmur, rubs, systolic 

murmur


GI/Abdominal exam: PRESENT: normal bowel sounds, soft, tenderness - suprapubic 

region.  ABSENT: ascites, mass


Extremities exam: PRESENT: pedal edema


Musculoskeletal exam: PRESENT: deformity - related to arthritis


Neurological exam: PRESENT: alert, awake - and appropriate in simple responses.


Psychiatric exam: PRESENT: appropriate affect, normal mood.  ABSENT: homicidal 

ideation, suicidal ideation


Skin exam: PRESENT: dry, intact, warm.  ABSENT: cyanosis, rash





Results


Laboratory Results: 


 





 01/24/18 04:02 





 01/24/18 04:02 





 











  01/23/18 01/24/18 01/24/18





  18:45 04:02 04:02


 


WBC   4.9 


 


RBC   3.32 L 


 


Hgb   10.3 L 


 


Hct   29.9 L 


 


MCV   90 


 


MCH   31.2 


 


MCHC   34.5 


 


RDW   14.7 H 


 


Plt Count   171 


 


Seg Neutrophils %   61.3 


 


Lymphocytes %   27.1 


 


Monocytes %   7.8 


 


Eosinophils %   3.2 


 


Basophils %   0.6 


 


Absolute Neutrophils   3.0 


 


Absolute Lymphocytes   1.3 


 


Absolute Monocytes   0.4 


 


Absolute Eosinophils   0.2 


 


Absolute Basophils   0.0 


 


Sodium    141.3


 


Potassium    3.4 L


 


Chloride    102


 


Carbon Dioxide    33 H


 


Anion Gap    6


 


BUN    16


 


Creatinine    0.87


 


Est GFR ( Amer)    > 60


 


Est GFR (Non-Af Amer)    > 60


 


Glucose    85


 


Calcium    8.8


 


Urine Color  YELLOW  


 


Urine Appearance  CLEAR  


 


Urine pH  8.0  


 


Ur Specific Gravity  1.009  


 


Urine Protein  NEGATIVE  


 


Urine Glucose (UA)  NEGATIVE  


 


Urine Ketones  NEGATIVE  


 


Urine Blood  NEGATIVE  


 


Urine Nitrite  NEGATIVE  


 


Ur Leukocyte Esterase  NEGATIVE  


 


Urine WBC (Auto)  6  


 


Urine RBC (Auto)  1  











Impressions: 


 





Chest X-Ray  01/21/18 12:11


IMPRESSION:  NO ACUTE CARDIOPULMONARY PROCESS.  NO SIGNIFICANT CHANGE FROM 

PRIOR STUDY.


 








Head CT  01/21/18 13:40


IMPRESSION:  MILD CHRONIC CHANGES OF ATROPHY AND MICROVASCULAR ISCHEMIA.  OLD 

INFARCT IN THE RIGHT OCCIPITAL LOBE.  CHRONIC SINUS DISEASE.  NO ACUTE PROCESS.


EVIDENCE OF ACUTE STROKE: NO.


 














Assessment & Plan





- Diagnosis


(1) Dysphagia


Qualifiers: 


   Dysphagia type: unspecified   Qualified Code(s): R13.10 - Dysphagia, 

unspecified   


Is this a current diagnosis for this admission?: Yes   





(2) Hypokalemia


Is this a current diagnosis for this admission?: Yes   





(3) HTN (hypertension)


Qualifiers: 


   Hypertension type: essential hypertension   Qualified Code(s): I10 - 

Essential (primary) hypertension   


Is this a current diagnosis for this admission?: Yes   





(4) HLD (hyperlipidemia)


Qualifiers: 


   Hyperlipidemia type: pure hypercholesterolemia   Qualified Code(s): E78.00 - 

Pure hypercholesterolemia, unspecified; E78.0 - Pure hypercholesterolemia   


Is this a current diagnosis for this admission?: Yes   





(5) Diabetes type 2, controlled


Qualifiers: 


   Diabetes mellitus complication status: without complication   Diabetes 

mellitus long term insulin use: without long term use   Qualified Code(s): 

E11.9 - Type 2 diabetes mellitus without complications   


Is this a current diagnosis for this admission?: Yes   





(6) Senile dementia of Alzheimer's type


Is this a current diagnosis for this admission?: Yes   





(7) Chronic pain syndrome


Is this a current diagnosis for this admission?: Yes   





(8) Osteoarthritis of multiple joints


Qualifiers: 


   Osteoarthritis type: primary   Qualified Code(s): M15.0 - Primary 

generalized (osteo)arthritis   


Is this a current diagnosis for this admission?: Yes   





(9) Dysuria


Is this a current diagnosis for this admission?: Yes   





- Time


Time Spent with patient: 25-34 minutes


Medications reviewed and adjusted accordingly: Yes


Anticipated discharge: Home with Homehealth


Within: Other





- Inpatient Certification


Based on my medical assessment, after consideration of the patient's 

comorbidities, presenting symptoms, or acuity I expect that the services needed 

warrant INPATIENT care.: Yes


I certify that my determination is in accordance with my understanding of 

Medicare's requirements for reasonable and necessary INPATIENT services [42 CFR 

412.3e].: Yes


Medical Necessity: Need Close Monitoring Due to Risk of Patient Decompensation, 

Need For IV Fluids, Need For Continuous Telemetry Monitoring, Need for IV 

Antibiotics, Risk of Complication if Not Cared For in Hospital


Post Hospital Care: D/C Planner Documentation





- Plan Summary


Plan Summary: 


She will continue IV Rocephin coverage pending urine culture organism 

identification and sensitivity in view of gram negative rods growth on urine 

culture. She will receive potassium supplementation. Maintain on all other 

current medication management.

## 2018-01-25 LAB
ADD MANUAL DIFF: NO
ANION GAP SERPL CALC-SCNC: 10 MMOL/L (ref 5–19)
BASOPHILS # BLD AUTO: 0 10^3/UL (ref 0–0.2)
BASOPHILS NFR BLD AUTO: 0.4 % (ref 0–2)
BUN SERPL-MCNC: 13 MG/DL (ref 7–20)
CALCIUM: 9.1 MG/DL (ref 8.4–10.2)
CHLORIDE SERPL-SCNC: 103 MMOL/L (ref 98–107)
CO2 SERPL-SCNC: 28 MMOL/L (ref 22–30)
EOSINOPHIL # BLD AUTO: 0.2 10^3/UL (ref 0–0.6)
EOSINOPHIL NFR BLD AUTO: 2.7 % (ref 0–6)
ERYTHROCYTE [DISTWIDTH] IN BLOOD BY AUTOMATED COUNT: 14.5 % (ref 11.5–14)
GLUCOSE SERPL-MCNC: 102 MG/DL (ref 75–110)
HCT VFR BLD CALC: 33.7 % (ref 36–47)
HGB BLD-MCNC: 11.6 G/DL (ref 12–15.5)
LYMPHOCYTES # BLD AUTO: 1.5 10^3/UL (ref 0.5–4.7)
LYMPHOCYTES NFR BLD AUTO: 24.1 % (ref 13–45)
MCH RBC QN AUTO: 30.9 PG (ref 27–33.4)
MCHC RBC AUTO-ENTMCNC: 34.3 G/DL (ref 32–36)
MCV RBC AUTO: 90 FL (ref 80–97)
MONOCYTES # BLD AUTO: 0.5 10^3/UL (ref 0.1–1.4)
MONOCYTES NFR BLD AUTO: 7.7 % (ref 3–13)
NEUTROPHILS # BLD AUTO: 4 10^3/UL (ref 1.7–8.2)
NEUTS SEG NFR BLD AUTO: 65.1 % (ref 42–78)
PLATELET # BLD: 183 10^3/UL (ref 150–450)
POTASSIUM SERPL-SCNC: 3.7 MMOL/L (ref 3.6–5)
RBC # BLD AUTO: 3.75 10^6/UL (ref 3.72–5.28)
SODIUM SERPL-SCNC: 141.1 MMOL/L (ref 137–145)
TOTAL CELLS COUNTED % (AUTO): 100 %
WBC # BLD AUTO: 6.1 10^3/UL (ref 4–10.5)

## 2018-01-25 RX ADMIN — RISPERIDONE SCH MG: 1 TABLET ORAL at 09:48

## 2018-01-25 RX ADMIN — ALPRAZOLAM SCH MG: 0.5 TABLET ORAL at 18:36

## 2018-01-25 RX ADMIN — MAGNESIUM OXIDE TAB 400 MG (241.3 MG ELEMENTAL MG) SCH MG: 400 (241.3 MG) TAB at 09:49

## 2018-01-25 RX ADMIN — ALPRAZOLAM SCH MG: 0.5 TABLET ORAL at 09:49

## 2018-01-25 RX ADMIN — CYCLOSPORINE SCH DROP: 0.5 EMULSION OPHTHALMIC at 21:31

## 2018-01-25 RX ADMIN — HYDROCODONE BITARTRATE AND ACETAMINOPHEN PRN TAB: 5; 325 TABLET ORAL at 18:36

## 2018-01-25 RX ADMIN — POTASSIUM CHLORIDE SCH MEQ: 750 TABLET, FILM COATED, EXTENDED RELEASE ORAL at 09:49

## 2018-01-25 RX ADMIN — SODIUM CHLORIDE PRN ML: 9 INJECTION, SOLUTION INTRAVENOUS at 09:53

## 2018-01-25 RX ADMIN — ENOXAPARIN SODIUM SCH MG: 40 INJECTION SUBCUTANEOUS at 09:48

## 2018-01-25 RX ADMIN — DONEPEZIL HYDROCHLORIDE SCH MG: 5 TABLET, FILM COATED ORAL at 21:31

## 2018-01-25 RX ADMIN — ATORVASTATIN CALCIUM SCH MG: 40 TABLET, FILM COATED ORAL at 21:31

## 2018-01-25 RX ADMIN — VERAPAMIL HYDROCHLORIDE SCH MG: 240 TABLET, FILM COATED, EXTENDED RELEASE ORAL at 09:49

## 2018-01-25 RX ADMIN — RISPERIDONE SCH MG: 1 TABLET ORAL at 21:31

## 2018-01-25 RX ADMIN — CEFTRIAXONE SODIUM SCH MG: 1 INJECTION, POWDER, FOR SOLUTION INTRAMUSCULAR; INTRAVENOUS at 21:31

## 2018-01-25 RX ADMIN — HYDROCODONE BITARTRATE AND ACETAMINOPHEN PRN TAB: 5; 325 TABLET ORAL at 04:20

## 2018-01-25 RX ADMIN — CYCLOSPORINE SCH DROP: 0.5 EMULSION OPHTHALMIC at 09:49

## 2018-01-25 NOTE — PDOC PROGRESS REPORT
Subjective


Progress Note for:: 01/25/18


Subjective:: 


Family at bedside. Tolerating oral feeding. No reported fever or chills. 

Patient denied chest pain or difficulty with breathing.


Reason For Visit: 


PERSISTENT HYPONATREMIA WITH FAILURE OF OUTPATIENT








Physical Exam


Vital Signs: 


 











Temp Pulse Resp BP Pulse Ox


 


 98.0 F   69   18   131/65 H  98 


 


 01/25/18 15:57  01/25/18 15:57  01/25/18 15:57  01/25/18 15:57  01/25/18 15:57








 Intake & Output











 01/24/18 01/25/18 01/26/18





 06:59 06:59 06:59


 


Intake Total  1758 473


 


Output Total  650 250


 


Balance  1108 223











Physical Exam: 


General appearance: PRESENT: no acute distress


Head exam: PRESENT: atraumatic, normocephalic


Eye exam: PRESENT: conjunctiva pink, EOMI, PERRLA.  ABSENT: scleral icterus


Mouth exam: PRESENT: moist


Respiratory exam: PRESENT: clear to auscultation joycelyn, decreased breath sounds - 

at lung bases


Cardiovascular exam: PRESENT: RRR.  ABSENT: diastolic murmur, rubs, systolic 

murmur


GI/Abdominal exam: PRESENT: normal bowel sounds, soft.  ABSENT: ascites, mass, 

tenderness


Extremities exam: PRESENT: pedal edema


Musculoskeletal exam: PRESENT: deformity - related to arthritis


Neurological exam: PRESENT: alert, awake - and appropriate in simple responses.


Psychiatric exam: PRESENT: appropriate affect, normal mood.  ABSENT: homicidal 

ideation, suicidal ideation


Skin exam: PRESENT: dry, intact, warm.  ABSENT: cyanosis, rash








Results


Laboratory Results: 


 





 01/25/18 07:00 





 01/25/18 07:00 





 











  01/25/18 01/25/18





  07:00 07:00


 


WBC  6.1 


 


RBC  3.75 


 


Hgb  11.6 L 


 


Hct  33.7 L 


 


MCV  90 


 


MCH  30.9 


 


MCHC  34.3 


 


RDW  14.5 H 


 


Plt Count  183 


 


Seg Neutrophils %  65.1 


 


Lymphocytes %  24.1 


 


Monocytes %  7.7 


 


Eosinophils %  2.7 


 


Basophils %  0.4 


 


Absolute Neutrophils  4.0 


 


Absolute Lymphocytes  1.5 


 


Absolute Monocytes  0.5 


 


Absolute Eosinophils  0.2 


 


Absolute Basophils  0.0 


 


Sodium   141.1


 


Potassium   3.7


 


Chloride   103


 


Carbon Dioxide   28


 


Anion Gap   10


 


BUN   13


 


Creatinine   0.83


 


Est GFR ( Amer)   > 60


 


Est GFR (Non-Af Amer)   > 60


 


Glucose   102


 


Calcium   9.1











Impressions: 


 





Chest X-Ray  01/21/18 12:11


IMPRESSION:  NO ACUTE CARDIOPULMONARY PROCESS.  NO SIGNIFICANT CHANGE FROM 

PRIOR STUDY.


 








Head CT  01/21/18 13:40


IMPRESSION:  MILD CHRONIC CHANGES OF ATROPHY AND MICROVASCULAR ISCHEMIA.  OLD 

INFARCT IN THE RIGHT OCCIPITAL LOBE.  CHRONIC SINUS DISEASE.  NO ACUTE PROCESS.


EVIDENCE OF ACUTE STROKE: NO.


 














Assessment & Plan





- Diagnosis


(1) Dysphagia


Qualifiers: 


   Dysphagia type: unspecified   Qualified Code(s): R13.10 - Dysphagia, 

unspecified   


Is this a current diagnosis for this admission?: Yes   





(2) Hypokalemia


Is this a current diagnosis for this admission?: Yes   





(3) HTN (hypertension)


Qualifiers: 


   Hypertension type: essential hypertension   Qualified Code(s): I10 - 

Essential (primary) hypertension   


Is this a current diagnosis for this admission?: Yes   





(4) HLD (hyperlipidemia)


Qualifiers: 


   Hyperlipidemia type: pure hypercholesterolemia   Qualified Code(s): E78.00 - 

Pure hypercholesterolemia, unspecified; E78.0 - Pure hypercholesterolemia   


Is this a current diagnosis for this admission?: Yes   





(5) Diabetes type 2, controlled


Qualifiers: 


   Diabetes mellitus complication status: without complication   Diabetes 

mellitus long term insulin use: without long term use   Qualified Code(s): 

E11.9 - Type 2 diabetes mellitus without complications   


Is this a current diagnosis for this admission?: Yes   





(6) Senile dementia of Alzheimer's type


Is this a current diagnosis for this admission?: Yes   





(7) Chronic pain syndrome


Is this a current diagnosis for this admission?: Yes   





(8) Osteoarthritis of multiple joints


Qualifiers: 


   Osteoarthritis type: primary   Qualified Code(s): M15.0 - Primary 

generalized (osteo)arthritis   


Is this a current diagnosis for this admission?: Yes   





(9) Dysuria


Is this a current diagnosis for this admission?: Yes   





(10) Infection due to gram-negative bacteria


Is this a current diagnosis for this admission?: Yes   


Plan: 


See attending physician orders.








- Time


Time Spent with patient: 25-34 minutes


Medications reviewed and adjusted accordingly: Yes


Anticipated discharge: Home with Homehealth


Within: Other





- Inpatient Certification


Based on my medical assessment, after consideration of the patient's 

comorbidities, presenting symptoms, or acuity I expect that the services needed 

warrant INPATIENT care.: Yes


I certify that my determination is in accordance with my understanding of 

Medicare's requirements for reasonable and necessary INPATIENT services [42 CFR 

412.3e].: Yes


Medical Necessity: Need For IV Fluids, Need For Continuous Telemetry Monitoring

, Need for IV Antibiotics, Risk of Complication if Not Cared For in Hospital


Post Hospital Care: D/C Planner Documentation





- Plan Summary


Plan Summary: 





Continue all current medication management. Follow up on urine culture for 

organism identification and sensitivity.

## 2018-01-26 VITALS — DIASTOLIC BLOOD PRESSURE: 88 MMHG | SYSTOLIC BLOOD PRESSURE: 158 MMHG

## 2018-01-26 RX ADMIN — SODIUM CHLORIDE PRN ML: 9 INJECTION, SOLUTION INTRAVENOUS at 06:57

## 2018-01-26 RX ADMIN — RISPERIDONE SCH MG: 1 TABLET ORAL at 10:15

## 2018-01-26 RX ADMIN — MAGNESIUM OXIDE TAB 400 MG (241.3 MG ELEMENTAL MG) SCH MG: 400 (241.3 MG) TAB at 10:19

## 2018-01-26 RX ADMIN — ALPRAZOLAM SCH MG: 0.5 TABLET ORAL at 10:18

## 2018-01-26 RX ADMIN — CYCLOSPORINE SCH DROP: 0.5 EMULSION OPHTHALMIC at 10:15

## 2018-01-26 RX ADMIN — ENOXAPARIN SODIUM SCH MG: 40 INJECTION SUBCUTANEOUS at 10:18

## 2018-01-26 RX ADMIN — POTASSIUM CHLORIDE SCH MEQ: 750 TABLET, FILM COATED, EXTENDED RELEASE ORAL at 10:18

## 2018-01-26 RX ADMIN — VERAPAMIL HYDROCHLORIDE SCH MG: 240 TABLET, FILM COATED, EXTENDED RELEASE ORAL at 10:16

## 2018-01-26 RX ADMIN — HYDROCODONE BITARTRATE AND ACETAMINOPHEN PRN TAB: 5; 325 TABLET ORAL at 03:18

## 2018-01-26 RX ADMIN — HYDROCODONE BITARTRATE AND ACETAMINOPHEN PRN TAB: 5; 325 TABLET ORAL at 15:20

## 2018-01-26 NOTE — PDOC DISCHARGE SUMMARY
General





- Admit/Disc Date/PCP


Admission Date/Primary Care Provider: 


  01/24/18 08:05





  MA[facility]NA[facility]





Discharge Date: 01/26/18





- Discharge Diagnosis


(1) Dysphagia


Is this a current diagnosis for this admission?: Yes   





(2) Hypokalemia


Is this a current diagnosis for this admission?: Yes   





(3) HTN (hypertension)


Is this a current diagnosis for this admission?: Yes   





(4) HLD (hyperlipidemia)


Is this a current diagnosis for this admission?: Yes   





(5) Diabetes type 2, controlled


Is this a current diagnosis for this admission?: Yes   





(6) Senile dementia of Alzheimer's type


Is this a current diagnosis for this admission?: Yes   





(7) Chronic pain syndrome


Is this a current diagnosis for this admission?: Yes   





(8) Osteoarthritis of multiple joints


Is this a current diagnosis for this admission?: Yes   





(9) UTI due to extended-spectrum beta lactamase (ESBL) producing Escherichia 

coli


Is this a current diagnosis for this admission?: Yes   


Summary: 


Maintain on Macrobid 100 mg po bid x 7 days.








- Additional Information


Resuscitation Status: Full Code


Prescriptions: 


Nitrofurantoin Monohyd/M-Cryst [Macrobid 100 mg Capsule] 100 mg PO BID 7 Days #

14 capsule


Home Medications: 








Alprazolam [Xanax 0.5 mg Tablet] 0.5 mg PO BID 01/22/18 


Alprazolam [Xanax 0.5 mg Tablet] 0.5 mg PO DAILYP PRN 01/22/18 


Cyclosporine [Restasis] 1 each OU Q12 01/22/18 


Donepezil HCl [Aricept 5 mg Tablet] 5 mg PO QHS 01/22/18 


Esomeprazole Magnesium [Nexium] 40 mg PO QAM 01/22/18 


Hydrochlorothiazide [Hydrodiuril 12.5 mg Capsule] 12.5 mg PO DAILY 01/22/18 


Hydrocodone/Acetaminophen [Hydrocodone-Acetamin 5-325 mg] 1 each PO Q6HP PRN 01/ 22/18 


Magnesium Oxide [Mag-Ox 400 mg Tablet] 400 mg PO DAILY 01/22/18 


Potassium Chloride 10 meq PO DAILY 01/22/18 


Risperidone [Risperdal] 0.5 mg PO Q12 01/22/18 


Rosuvastatin Calcium [Crestor 20 mg Tablet] 20 mg PO QHS 01/22/18 


Verapamil HCl [Verapamil ER] 240 mg PO DAILY 01/22/18 


Nitrofurantoin Monohyd/M-Cryst [Macrobid 100 mg Capsule] 100 mg PO BID 7 Days #

14 capsule 01/26/18 











History of Present Illness


Patient complains of: Difficulty with swallowing , Facial drooling


History of Present Illness: 


ROHIT WEATHERS is a 82 year old female known to my practice who was brought to 

the ED by family due to onset of difficulty with swallowing at breakfast prior 

to her presentation. There was associated drooling. Patient denied any definite 

sore throat, choking feeling or prior intake of any unusual food or drink. 

Family reported that her drooling has been progressive over 2 days preceding 

her presentation. She was recently diagnosed with significant hypokalemia for 

which she was treated with oral potassium supplementation. Family reported 

generalized weakness but she recently start home physical therapy program. Her 

morbidities include Hypertension, Hyperlipidemia, Asthma, Diabetes Mellitus 

Type 2, Osteoarthritis with chronic pain syndrome, Senile dementia, and Anxiety.





Hospital Course


Hospital Course: 


Patient presenting symptoms resolved quickly after evaluation n the ED. She was 

able to tolerate oral feeding since admission to the hospital with feeding 

assistance. No recurrence of drooling. Her persistent hypokalemia did resolved 

after adequate replacement therapy. Patient expressed pain with urination and 

her urine culture eventually grew ESBL E. Coli. She has been on Macrobid based 

on her culture sensitivity report. She will be discharge home on Macrobid 100 

mg po bid x 7 days. she will follow up in the office as instructed upon 

discharge.





Physical Exam


Vital Signs: 


 











Temp Pulse Resp BP Pulse Ox


 


 98.3 F   59 L  18   170/71 H  96 


 


 01/26/18 12:01  01/26/18 14:00  01/26/18 12:01  01/26/18 12:01  01/26/18 12:01








 Intake & Output











 01/25/18 01/26/18 01/27/18





 06:59 06:59 06:59


 


Intake Total 1758 1658 


 


Output Total 650 900 


 


Balance 1108 758 











Physical Exam: 


General appearance: PRESENT: no acute distress


Head exam: PRESENT: atraumatic, normocephalic


Eye exam: PRESENT: conjunctiva pink, EOMI, PERRLA.  ABSENT: scleral icterus


Mouth exam: PRESENT: moist


Respiratory exam: PRESENT: clear to auscultation joycelyn, decreased breath sounds - 

at lung bases


Cardiovascular exam: PRESENT: RRR.  ABSENT: diastolic murmur, rubs, systolic 

murmur


GI/Abdominal exam: PRESENT: normal bowel sounds, soft.  ABSENT: ascites, mass, 

tenderness


Extremities exam: PRESENT: pedal edema


Musculoskeletal exam: PRESENT: deformity - related to arthritis


Neurological exam: PRESENT: alert, awake - and appropriate in simple responses.


Psychiatric exam: PRESENT: appropriate affect, normal mood.  ABSENT: homicidal 

ideation, suicidal ideation


Skin exam: PRESENT: dry, intact, warm.  ABSENT: cyanosis, rash





Results


Laboratory Results: 


 





 01/25/18 07:00 





 01/25/18 07:00 








Impressions: 


 





Chest X-Ray  01/21/18 12:11


IMPRESSION:  NO ACUTE CARDIOPULMONARY PROCESS.  NO SIGNIFICANT CHANGE FROM 

PRIOR STUDY.


 








Head CT  01/21/18 13:40


IMPRESSION:  MILD CHRONIC CHANGES OF ATROPHY AND MICROVASCULAR ISCHEMIA.  OLD 

INFARCT IN THE RIGHT OCCIPITAL LOBE.  CHRONIC SINUS DISEASE.  NO ACUTE PROCESS.


EVIDENCE OF ACUTE STROKE: NO.


 














Qualifiers


**PATEINT BEING DISCHARGED WITH ANY OF THE FOLLOWING DIAGNOSIS?: No





Plan


Discharge Plan: 





D/C home today with prescription for Macrobid 100 mg po bid x 7 days.. She will 

resume her [facility] services.

## 2018-02-11 ENCOUNTER — HOSPITAL ENCOUNTER (EMERGENCY)
Dept: HOSPITAL 62 - ER | Age: 83
Discharge: HOME | End: 2018-02-11
Payer: MEDICARE

## 2018-02-11 VITALS — SYSTOLIC BLOOD PRESSURE: 134 MMHG | DIASTOLIC BLOOD PRESSURE: 60 MMHG

## 2018-02-11 DIAGNOSIS — Z88.2: ICD-10-CM

## 2018-02-11 DIAGNOSIS — I45.10: ICD-10-CM

## 2018-02-11 DIAGNOSIS — Z79.899: ICD-10-CM

## 2018-02-11 DIAGNOSIS — Z86.73: ICD-10-CM

## 2018-02-11 DIAGNOSIS — F02.80: ICD-10-CM

## 2018-02-11 DIAGNOSIS — I10: ICD-10-CM

## 2018-02-11 DIAGNOSIS — Z88.5: ICD-10-CM

## 2018-02-11 DIAGNOSIS — Z87.440: ICD-10-CM

## 2018-02-11 DIAGNOSIS — I48.91: ICD-10-CM

## 2018-02-11 DIAGNOSIS — G30.1: Primary | ICD-10-CM

## 2018-02-11 DIAGNOSIS — J45.909: ICD-10-CM

## 2018-02-11 DIAGNOSIS — Z88.1: ICD-10-CM

## 2018-02-11 DIAGNOSIS — R51: ICD-10-CM

## 2018-02-11 DIAGNOSIS — D64.9: ICD-10-CM

## 2018-02-11 LAB
ADD MANUAL DIFF: NO
ALBUMIN SERPL-MCNC: 3.3 G/DL (ref 3.5–5)
ALP SERPL-CCNC: 53 U/L (ref 38–126)
ALT SERPL-CCNC: 19 U/L (ref 9–52)
ANION GAP SERPL CALC-SCNC: 8 MMOL/L (ref 5–19)
APPEARANCE UR: (no result)
APTT BLD: 29.2 SEC (ref 23.5–35.8)
APTT PPP: YELLOW S
AST SERPL-CCNC: 28 U/L (ref 14–36)
BASOPHILS # BLD AUTO: 0 10^3/UL (ref 0–0.2)
BASOPHILS NFR BLD AUTO: 0.8 % (ref 0–2)
BILIRUB DIRECT SERPL-MCNC: 0.1 MG/DL (ref 0–0.4)
BILIRUB SERPL-MCNC: 0.5 MG/DL (ref 0.2–1.3)
BILIRUB UR QL STRIP: NEGATIVE
BUN SERPL-MCNC: 30 MG/DL (ref 7–20)
CALCIUM: 9.1 MG/DL (ref 8.4–10.2)
CHLORIDE SERPL-SCNC: 102 MMOL/L (ref 98–107)
CK MB SERPL-MCNC: 1.92 NG/ML (ref ?–4.55)
CK SERPL-CCNC: 126 U/L (ref 30–135)
CO2 SERPL-SCNC: 33 MMOL/L (ref 22–30)
EOSINOPHIL # BLD AUTO: 0.1 10^3/UL (ref 0–0.6)
EOSINOPHIL NFR BLD AUTO: 3 % (ref 0–6)
ERYTHROCYTE [DISTWIDTH] IN BLOOD BY AUTOMATED COUNT: 14.8 % (ref 11.5–14)
GLUCOSE SERPL-MCNC: 93 MG/DL (ref 75–110)
GLUCOSE UR STRIP-MCNC: NEGATIVE MG/DL
HCT VFR BLD CALC: 33.5 % (ref 36–47)
HGB BLD-MCNC: 11.4 G/DL (ref 12–15.5)
INR PPP: 1.04
KETONES UR STRIP-MCNC: NEGATIVE MG/DL
LYMPHOCYTES # BLD AUTO: 1.7 10^3/UL (ref 0.5–4.7)
LYMPHOCYTES NFR BLD AUTO: 35.9 % (ref 13–45)
MCH RBC QN AUTO: 31.1 PG (ref 27–33.4)
MCHC RBC AUTO-ENTMCNC: 34 G/DL (ref 32–36)
MCV RBC AUTO: 92 FL (ref 80–97)
MONOCYTES # BLD AUTO: 0.3 10^3/UL (ref 0.1–1.4)
MONOCYTES NFR BLD AUTO: 6.9 % (ref 3–13)
NEUTROPHILS # BLD AUTO: 2.6 10^3/UL (ref 1.7–8.2)
NEUTS SEG NFR BLD AUTO: 53.4 % (ref 42–78)
NITRITE UR QL STRIP: NEGATIVE
PH UR STRIP: 5 [PH] (ref 5–9)
PLATELET # BLD: 174 10^3/UL (ref 150–450)
POTASSIUM SERPL-SCNC: 3.6 MMOL/L (ref 3.6–5)
PROT SERPL-MCNC: 5.6 G/DL (ref 6.3–8.2)
PROT UR STRIP-MCNC: NEGATIVE MG/DL
PROTHROMBIN TIME: 14.3 SEC (ref 11.4–15.4)
RBC # BLD AUTO: 3.66 10^6/UL (ref 3.72–5.28)
SODIUM SERPL-SCNC: 142.5 MMOL/L (ref 137–145)
SP GR UR STRIP: 1.03
TOTAL CELLS COUNTED % (AUTO): 100 %
TROPONIN I SERPL-MCNC: 0.01 NG/ML
UROBILINOGEN UR-MCNC: 4 MG/DL (ref ?–2)
WBC # BLD AUTO: 4.8 10^3/UL (ref 4–10.5)

## 2018-02-11 PROCEDURE — 85610 PROTHROMBIN TIME: CPT

## 2018-02-11 PROCEDURE — 71045 X-RAY EXAM CHEST 1 VIEW: CPT

## 2018-02-11 PROCEDURE — 93010 ELECTROCARDIOGRAM REPORT: CPT

## 2018-02-11 PROCEDURE — 99285 EMERGENCY DEPT VISIT HI MDM: CPT

## 2018-02-11 PROCEDURE — 82550 ASSAY OF CK (CPK): CPT

## 2018-02-11 PROCEDURE — 80053 COMPREHEN METABOLIC PANEL: CPT

## 2018-02-11 PROCEDURE — 93005 ELECTROCARDIOGRAM TRACING: CPT

## 2018-02-11 PROCEDURE — 85730 THROMBOPLASTIN TIME PARTIAL: CPT

## 2018-02-11 PROCEDURE — 85025 COMPLETE CBC W/AUTO DIFF WBC: CPT

## 2018-02-11 PROCEDURE — 82553 CREATINE MB FRACTION: CPT

## 2018-02-11 PROCEDURE — 81001 URINALYSIS AUTO W/SCOPE: CPT

## 2018-02-11 PROCEDURE — 36415 COLL VENOUS BLD VENIPUNCTURE: CPT

## 2018-02-11 PROCEDURE — 70450 CT HEAD/BRAIN W/O DYE: CPT

## 2018-02-11 PROCEDURE — 87086 URINE CULTURE/COLONY COUNT: CPT

## 2018-02-11 PROCEDURE — 84484 ASSAY OF TROPONIN QUANT: CPT

## 2018-02-11 NOTE — RADIOLOGY REPORT (SQ)
EXAM DESCRIPTION:  CT HEAD WITHOUT



COMPLETED DATE/TIME:  2/11/2018 2:56 pm



REASON FOR STUDY:  bed 1 stroke alert



COMPARISON:  1/21/2018



TECHNIQUE:  Axial images acquired through the brain without intravenous contrast.  Images reviewed wi
th bone, brain and subdural windows.  Images stored on PACS.

All CT scanners at this facility use dose modulation, iterative reconstruction, and/or weight based d
osing when appropriate to reduce radiation dose to as low as reasonably achievable (ALARA).

[facility]: Dose Right  [facility]: CareDose    [facility]: Dose Right    [facility]: Teradose 4D    [facility]: Smart Technologies



RADIATION DOSE:  CT Rad equipment meets quality standard of care and radiation dose reduction techniq
ues were employed. CTDIvol: 64.6 mGy. DLP: 1034 mGy-cm.mGy.



LIMITATIONS:  None.



FINDINGS:  VENTRICLES: Prominent.

CEREBRUM: No masses.  No hemorrhage.  No midline shift.  Areas of low density in the white matter mos
t likely due to chronic micro-vascular ischemic change.  Stable chronic infarction right occipital lo
be.  No evidence for acute infarction.

CEREBELLUM: No masses.  No hemorrhage.  No alteration of density.  No evidence for acute infarction.

EXTRAAXIAL SPACES: Age-related involutional change.  No fluid collections.  No masses.

ORBITS AND GLOBE: No intra- or extraconal masses.  Normal contour of globe without masses.

CALVARIUM: No fracture.

PARANASAL SINUSES: No fluid or mucosal thickening.

SOFT TISSUES: No mass or hematoma.

OTHER: No other significant finding.



IMPRESSION:  NO CT EVIDENCE OF ACUTE ISCHEMIA, HEMORRHAGE, OR MASS LESION.  NO SIGNIFICANT CHANGE FRO
M THE PRIOR STUDY.

EVIDENCE OF ACUTE STROKE: NO.



TECHNICAL DOCUMENTATION:  JOB ID:  [id number]

Quality ID # [id number]: Final reports with documentation of one or more dose reduction techniques (e.g., Au
tomated exposure control, adjustment of the mA and/or kV according to patient size, use of iterative 
reconstruction technique)

 2011 [employer]- All Rights Reserved

## 2018-02-11 NOTE — RADIOLOGY REPORT (SQ)
EXAM DESCRIPTION:  CHEST SINGLE VIEW



COMPLETED DATE/TIME:  2/11/2018 3:06 pm



REASON FOR STUDY:  bed 1 stroke alert



COMPARISON:  1/21/2018



EXAM PARAMETERS:  NUMBER OF VIEWS: One view.

TECHNIQUE: Single frontal radiographic view of the chest acquired.

RADIATION DOSE: NA

LIMITATIONS: None.



FINDINGS:  LUNGS AND PLEURA: No opacities, masses or pneumothorax. No pleural effusion.

MEDIASTINUM AND HILAR STRUCTURES: No masses.  Contour normal.

HEART AND VASCULAR STRUCTURES: Heart normal in size.  Normal vasculature.

BONES: No acute findings.

HARDWARE: None in the chest.

OTHER: No other significant finding.



IMPRESSION:  NO ACUTE RADIOGRAPHIC FINDING IN THE CHEST.



TECHNICAL DOCUMENTATION:  JOB ID:  [id number]

 2011 [employer]- All Rights Reserved

## 2018-02-11 NOTE — ER DOCUMENT REPORT
ED Neuro Symptoms/Deficit





<WILLIAM SEWELL - Last Filed: 02/11/18 23:09>





- General


Information source: Relative


TRAVEL OUTSIDE OF THE [place] IN LAST 30 DAYS: No





<COSME DEGROOT - Last Filed: 02/11/18 23:15>





<YULIET ROGERS - Last Filed: 02/11/18 23:41>





- General


Chief Complaint: Altered Mental Status


Stated Complaint: ALTERED MENTAL STATUS


Time Seen by Provider: 02/11/18 14:48


Notes: 





Ms. Guevara is an 82 y.o female with a PMHx of of anxiety, dementia and stroke. 

Daughter at bedside states that she was here this past week for potassium and 

UTI. She reports that patient stated she was not feeling well and then stopped 

talking with family for about 30-45, minutes. Daughter states that pt's 

dementia medication was changed from day time to night time. Patient reports a 

HA but denies any other pain. Daughter denies any vomiting, diarrhea, fever, CP 

or facial droop. Daughter denies any history of DM. Patient does not answer any 

other questions concerning her symptoms. (COSME DEGROOT)





- Related Data


Allergies/Adverse Reactions: 


 





codeine [Codeine] Allergy (Verified 01/21/18 11:19)


 


doxycycline [Doxycycline] Allergy (Verified 01/21/18 11:19)


 


Sulfa (Sulfonamide Antibiotics) Allergy (Verified 01/21/18 11:19)


 











Past Medical History





- General


Information source: Relative





- Social History


Smoking Status: Never Smoker


Cigarette use (# per day): No


Chew tobacco use (# tins/day): No


Smoking Education Provided: No


Frequency of alcohol use: None


Drug Abuse: None


Lives with: Family


Family History: Reviewed & Not Pertinent





- Past Medical History


Cardiac Medical History: Reports: Hx Hypercholesterolemia, Hx Hypertension


   Denies: Hx Congestive Heart Failure, Hx Heart Attack


Pulmonary Medical History: Reports: Hx Asthma


Endocrine Medical History: Reports: Hx Diabetes Mellitus Type 2


Renal/ Medical History: Denies: Hx Peritoneal Dialysis


Musculoskeltal Medical History: Reports Hx Arthritis


Psychiatric Medical History: Reports: Hx Anxiety, Hx Depression





- Immunizations


Hx Diphtheria, Pertussis, Tetanus Vaccination: Yes





<COSME DEGROOT - Last Filed: 02/11/18 23:15>





Review of Systems





- Review of Systems


-: Yes ROS unobtainable due to patient's medical condition - Patient does not 

answer many questions concerning symptoms


Constitutional: No symptoms reported


EENT: No symptoms reported


Cardiovascular: No symptoms reported.  denies: Chest pain


Respiratory: No symptoms reported


Gastrointestinal: No symptoms reported.  denies: Diarrhea, Vomiting


Genitourinary: No symptoms reported


Female Genitourinary: No symptoms reported


Musculoskeletal: No symptoms reported


Skin: No symptoms reported


Hematologic/Lymphatic: No symptoms reported


Neurological/Psychological: Confusion - AMS, Headaches





<COSME DEGROOT - Last Filed: 02/11/18 23:15>





Physical Exam





<WILLIAM SEWELL - Last Filed: 02/11/18 23:09>





- Neurological


Neuro grossly intact: Yes


Orientation: Disoriented to time - Does not know year, Disoriented to events - 

thinks President Tyler is still in office.


[place] Coma Scale Eye Opening: To Voice


[place] Coma Scale Verbal: Confused


Adrián Coma Scale Motor: Obeys Commands


Adrián Coma Scale Total: 13


Speech: Normal





<COSME DEGROOT - Last Filed: 02/11/18 23:15>





<YULIET ROGERS - Last Filed: 02/11/18 23:41>





- Vital signs


Vitals: 


 











Resp Pulse Ox


 


 13   96 


 


 02/11/18 15:12  02/11/18 15:12














- Notes


Notes: 





GENERAL: Lying in bed, awakes to voice. No spontaneous speech. 


HEAD: Normocephalic, atraumatic.


EYES: Pupils equal, round, and reactive to light. Extraocular movements intact.


ENT: Oral mucosa moist, tongue midline.


NECK: Full range of motion. Supple. Trachea midline.


LUNGS: Clear to auscultation bilaterally, no wheezes, rales, or rhonchi. No 

respiratory distress.


HEART: Regular rate and rhythm. No murmurs, gallops, or rubs.


ABDOMEN: Soft, non-tender. Non-distended. Bowel sounds present in all 4 

quadrants.


EXTREMITIES: Moves all 4 extremities spontaneously. No edema, radial and 

dorsalis pedis pulses 2/4 bilaterally. No cyanosis.


NEUROLOGICAL: Awakes to voice. No spontaneous speech. Oriented to place and 

person but not time. Follows commands to squeeze hand. 


PSYCH: Flat affect.


SKIN: Warm, dry, normal turgor. No rashes or lesions noted.








 (COSME DEGROOT)





Course





- Laboratory


Result Diagrams: 


 02/11/18 15:26





 02/11/18 15:26





<WILLIAM SEWELL - Last Filed: 02/11/18 23:09>





- Laboratory


Result Diagrams: 


 02/11/18 15:26





 02/11/18 15:26





<COSME DEGROOT - Last Filed: 02/11/18 23:15>





- Laboratory


Result Diagrams: 


 02/11/18 15:26





 02/11/18 15:26





<YULIET ROGERS - Last Filed: 02/11/18 23:41>





- Re-evaluation


Re-evalutation: 





02/11/18 16:54


CBC shows mild anemia with hemoglobin 11.4, coags grossly unremarkable, CMP 

grossly unremarkable, only mildly elevated BUN, cardiac enzymes negative, 

urinalysis does not show any signs of infection.  CT scan of the head shows 

chronic microvascular ischemia, no acute process, chest x-ray shows no acute 

process.





02/11/18 16:58


EKG does show some irregularity, appears to be atrial fibrillation, this is not 

documented previously in the record however she is rate controlled, already 

taking verapamil, is a fall risk so would not qualify for anticoagulation even 

with a daily baby aspirin.  This is likely not contributing to her altered 

mental status which has resolved.  Her altered mental status is really more 

consistent with dementia.  Patient is to follow-up with Dr. Arenas as an 

outpatient. (YULIET ROGERS)





- Vital Signs


Vital signs: 


 











Temp Pulse Resp BP Pulse Ox


 


    52 L  13   134/60 H  95 


 


    02/11/18 15:14  02/11/18 17:01  02/11/18 17:00  02/11/18 17:01














- Laboratory


Laboratory results interpreted by me: 


 











  02/11/18 02/11/18 02/11/18





  15:26 15:26 15:51


 


RBC  3.66 L  


 


Hgb  11.4 L  


 


Hct  33.5 L  


 


RDW  14.8 H  


 


Carbon Dioxide   33 H 


 


BUN   30 H 


 


Est GFR ( Amer)   53 L 


 


Est GFR (Non-Af Amer)   44 L 


 


Total Protein   5.6 L 


 


Albumin   3.3 L 


 


Urine Urobilinogen    4.0 H














- EKG Interpretation by Me


Additional EKG results interpreted by me: 





02/11/18 16:58


EKG shows atrial fibrillation at a rate of 59, disagree with computer 

interpretation that there is a run of PVCs, i see 1 PVC.  Normal axis, there is 

a right bundle branch block, very poor baseline no gross ST segment elevations 

per my interpretation. (YULIET ROGERS)





Discharge





<WILLIAM SEWELL - Last Filed: 02/11/18 23:09>





<COSME DEGROOT - Last Filed: 02/11/18 23:15>





<YULIET ROGERS - Last Filed: 02/11/18 23:41>





- Discharge


Clinical Impression: 


 Senile dementia of Alzheimer's type





Altered mental status


Qualifiers:


 Altered mental status type: disorientation Qualified Code(s): R41.0 - 

Disorientation, unspecified





HTN (hypertension)


Qualifiers:


 Hypertension type: essential hypertension Qualified Code(s): I10 - Essential (

primary) hypertension





Condition: Stable


Disposition: HOME, SELF-CARE


Additional Instructions: 


Today there is no evidence of infection or metabolic abnormality such as 

abnormal potassium.  She does have a somewhat irregular heartbeat.  This is not 

likely contributing to her confusion.  Her confusion is consistent with 

dementia.  It is very important that you follow-up with Dr. Arenas as an 

outpatient to plan for her care as her dementia worsens.





Please return if she loses consciousness, develops fevers or develops any new 

or concerning symptoms.


Referrals: 


MAYNOR ARENAS MD [Primary Care Provider] - Follow up as needed


Scribe Attestation: 





02/11/18 23:41


I personally performed the services described in the documentation, reviewed 

and edited the documentation which was dictated to the scribe in my presence, 

and it accurately records my words and actions. (YULIET ROGERS)





Scribe Documentation





- Scribe


Written by Joseph:: Joseph Fischer, [id number], 2/11/2018


acting as scribe for :: Elana





<WILLIAM SEWELL - Last Filed: 02/11/18 23:09>

## 2018-02-11 NOTE — EKG REPORT
SEVERITY:- ABNORMAL ECG -

ATRIAL FIBRILLATION

RUN OF VENTRICULAR PREMATURE COMPLEXES VS ARTIFACT

RIGHT BUNDLE BRANCH BLOCK

:

Confirmed by: Jaden Bae 11-Feb-2018 20:51:04

## 2018-02-21 ENCOUNTER — HOSPITAL ENCOUNTER (EMERGENCY)
Dept: HOSPITAL 62 - ER | Age: 83
Discharge: HOME | End: 2018-02-21
Payer: MEDICARE

## 2018-02-21 VITALS — SYSTOLIC BLOOD PRESSURE: 145 MMHG | DIASTOLIC BLOOD PRESSURE: 58 MMHG

## 2018-02-21 DIAGNOSIS — I10: ICD-10-CM

## 2018-02-21 DIAGNOSIS — Z79.899: ICD-10-CM

## 2018-02-21 DIAGNOSIS — Z88.5: ICD-10-CM

## 2018-02-21 DIAGNOSIS — Z88.1: ICD-10-CM

## 2018-02-21 DIAGNOSIS — Z87.891: ICD-10-CM

## 2018-02-21 DIAGNOSIS — R60.0: Primary | ICD-10-CM

## 2018-02-21 DIAGNOSIS — Z88.2: ICD-10-CM

## 2018-02-21 DIAGNOSIS — J45.909: ICD-10-CM

## 2018-02-21 DIAGNOSIS — E11.9: ICD-10-CM

## 2018-02-21 LAB
ADD MANUAL DIFF: NO
ANION GAP SERPL CALC-SCNC: 9 MMOL/L (ref 5–19)
BASOPHILS # BLD AUTO: 0 10^3/UL (ref 0–0.2)
BASOPHILS NFR BLD AUTO: 0.9 % (ref 0–2)
BUN SERPL-MCNC: 29 MG/DL (ref 7–20)
CALCIUM: 9.2 MG/DL (ref 8.4–10.2)
CHLORIDE SERPL-SCNC: 100 MMOL/L (ref 98–107)
CO2 SERPL-SCNC: 31 MMOL/L (ref 22–30)
EOSINOPHIL # BLD AUTO: 0.1 10^3/UL (ref 0–0.6)
EOSINOPHIL NFR BLD AUTO: 2.9 % (ref 0–6)
ERYTHROCYTE [DISTWIDTH] IN BLOOD BY AUTOMATED COUNT: 14.4 % (ref 11.5–14)
GLUCOSE SERPL-MCNC: 108 MG/DL (ref 75–110)
HCT VFR BLD CALC: 34.9 % (ref 36–47)
HGB BLD-MCNC: 12.1 G/DL (ref 12–15.5)
LYMPHOCYTES # BLD AUTO: 1.5 10^3/UL (ref 0.5–4.7)
LYMPHOCYTES NFR BLD AUTO: 32.3 % (ref 13–45)
MCH RBC QN AUTO: 31.2 PG (ref 27–33.4)
MCHC RBC AUTO-ENTMCNC: 34.7 G/DL (ref 32–36)
MCV RBC AUTO: 90 FL (ref 80–97)
MONOCYTES # BLD AUTO: 0.3 10^3/UL (ref 0.1–1.4)
MONOCYTES NFR BLD AUTO: 6.7 % (ref 3–13)
NEUTROPHILS # BLD AUTO: 2.7 10^3/UL (ref 1.7–8.2)
NEUTS SEG NFR BLD AUTO: 57.2 % (ref 42–78)
NT PRO BNP: 390 PG/ML (ref ?–450)
PLATELET # BLD: 174 10^3/UL (ref 150–450)
POTASSIUM SERPL-SCNC: 3.7 MMOL/L (ref 3.6–5)
RBC # BLD AUTO: 3.87 10^6/UL (ref 3.72–5.28)
SODIUM SERPL-SCNC: 139.7 MMOL/L (ref 137–145)
TOTAL CELLS COUNTED % (AUTO): 100 %
TROPONIN I SERPL-MCNC: < 0.012 NG/ML
WBC # BLD AUTO: 4.7 10^3/UL (ref 4–10.5)

## 2018-02-21 PROCEDURE — 84484 ASSAY OF TROPONIN QUANT: CPT

## 2018-02-21 PROCEDURE — 99284 EMERGENCY DEPT VISIT MOD MDM: CPT

## 2018-02-21 PROCEDURE — 83880 ASSAY OF NATRIURETIC PEPTIDE: CPT

## 2018-02-21 PROCEDURE — 71046 X-RAY EXAM CHEST 2 VIEWS: CPT

## 2018-02-21 PROCEDURE — 93010 ELECTROCARDIOGRAM REPORT: CPT

## 2018-02-21 PROCEDURE — 80048 BASIC METABOLIC PNL TOTAL CA: CPT

## 2018-02-21 PROCEDURE — 36415 COLL VENOUS BLD VENIPUNCTURE: CPT

## 2018-02-21 PROCEDURE — 85025 COMPLETE CBC W/AUTO DIFF WBC: CPT

## 2018-02-21 PROCEDURE — 93005 ELECTROCARDIOGRAM TRACING: CPT

## 2018-02-21 NOTE — RADIOLOGY REPORT (SQ)
EXAM DESCRIPTION:  CHEST PA/LAT



COMPLETED DATE/TIME:  2/21/2018 9:46 pm



REASON FOR STUDY:  leg swelling, concern for CHF



COMPARISON:  1/21/2018



EXAM PARAMETERS:  NUMBER OF VIEWS: two views

TECHNIQUE: Digital Frontal and Lateral radiographic views of the chest acquired.

RADIATION DOSE: NA

LIMITATIONS: none



FINDINGS:  LUNGS AND PLEURA: No acute opacities, masses or pneumothorax. No pleural effusion.

MEDIASTINUM AND HILAR STRUCTURES: Stable.

HEART AND VASCULAR STRUCTURES: Heart normal size.  No evidence for failure.

BONES: No acute findings.

HARDWARE: None in the chest.

OTHER: No other significant finding.



IMPRESSION:  No acute findings.



TECHNICAL DOCUMENTATION:  JOB ID:  [id number]

[id number]

 2011 [employer]- All Rights Reserved

## 2018-02-21 NOTE — ER DOCUMENT REPORT
ED General





- General


Mode of Arrival: Ambulatory


Information source: Patient


TRAVEL OUTSIDE OF THE [place] IN LAST 30 DAYS: No





<LAUREN FLETCHER - Last Filed: 02/21/18 23:16>





<BECKY BOSS - Last Filed: 02/22/18 03:48>





- General


Chief Complaint: Leg Swelling


Stated Complaint: FEET SWELLING


Time Seen by Provider: 02/21/18 18:47


Notes: 


Patient is an 82-year-old female with a history of dementia presents to the 

emergency department accompanied by daughter and son-in-law complaining of 

bilateral lower extremity swelling.  Family states the patient normally takes 

fluid pills. Family members are poor historians. 





Patients PCP is Dr. Arenas.  (LAUREN FLETCHER)





- Related Data


Allergies/Adverse Reactions: 


 





codeine [Codeine] Allergy (Verified 02/21/18 18:14)


 


doxycycline [Doxycycline] Allergy (Verified 02/21/18 18:14)


 


Sulfa (Sulfonamide Antibiotics) Allergy (Verified 02/21/18 18:14)


 











Past Medical History





- General


Information source: Patient





- Social History


Smoking Status: Former Smoker


Chew tobacco use (# tins/day): No


Frequency of alcohol use: None


Drug Abuse: None


Family History: Reviewed & Not Pertinent


Patient has suicidal ideation: No


Patient has homicidal ideation: No





- Past Medical History


Cardiac Medical History: Reports: Hx Hypercholesterolemia, Hx Hypertension


Pulmonary Medical History: Reports: Hx Asthma


Endocrine Medical History: Reports: Hx Diabetes Mellitus Type 2


Musculoskeltal Medical History: Reports Hx Arthritis


Psychiatric Medical History: Reports: Hx Anxiety, Hx Depression





- Immunizations


Hx Diphtheria, Pertussis, Tetanus Vaccination: Yes





<LAUREN FLETCHER - Last Filed: 02/21/18 23:16>





Review of Systems





- Review of Systems


Constitutional: No symptoms reported


EENT: No symptoms reported


Cardiovascular: No symptoms reported


Respiratory: No symptoms reported


Gastrointestinal: No symptoms reported


Genitourinary: No symptoms reported


Female Genitourinary: No symptoms reported


Musculoskeletal: See HPI


Skin: No symptoms reported


Hematologic/Lymphatic: No symptoms reported


Neurological/Psychological: No symptoms reported


-: Yes All other systems reviewed and negative





<LAUREN FLETCHER - Last Filed: 02/21/18 23:16>





Physical Exam





<LAUREN FLETCHER - Last Filed: 02/21/18 23:16>





<BECKY BOSS - Last Filed: 02/22/18 03:48>





- Vital signs


Vitals: 


 











Temp Pulse Resp BP Pulse Ox


 


 98.5 F   49 L  16   133/74 H  96 


 


 02/21/18 18:24  02/21/18 18:24  02/21/18 18:24  02/21/18 18:24  02/21/18 18:24














- Notes


Notes: 


GENERAL: Alert, interacts well. No acute distress.


HEAD: Normocephalic, atraumatic.


EYES: Pupils equal, round, and reactive to light. Extraocular movements intact.


ENT: Oral mucosa moist, tongue midline.


NECK: Full range of motion. Supple. Trachea midline.


LUNGS: Clear to auscultation bilaterally, no wheezes, rales, or rhonchi. No 

respiratory distress.


HEART: Regular rate and rhythm. No murmurs, gallops, or rubs.


ABDOMEN: Soft, non-tender. Non-distended. Bowel sounds present in all 4 

quadrants.


EXTREMITIES: Moves all 4 extremities spontaneously. 1+ pitting edema.


NEUROLOGICAL: Alert and oriented x3. Normal speech.


PSYCH: Normal affect, normal mood.


SKIN: Warm, dry, normal turgor. No rashes or lesions noted.


 (LAUREN FLETCHER)





Course





- Laboratory


Result Diagrams: 


 02/21/18 19:30





 02/21/18 19:30





<LAUREN FLETCHER - Last Filed: 02/21/18 23:16>





- Laboratory


Result Diagrams: 


 02/21/18 19:30





 02/21/18 19:30





<BECKY BOSS - Last Filed: 02/22/18 03:48>





- Re-evaluation


Re-evalutation: 





02/21/18 22:12


Patient presents with leg swelling.  Her Lasix has recently been adjusted due 

to electrolyte imbalance per the family members.  Her labs today are within 

normal limits are trending within her baseline.  Patient has not had recent 

fevers or illnesses.  EKG shows no concerning findings from previous on file 

and her chest x-ray is clear with no acute abnormalities.  Discussed with 

family members need to use compression stockings that they have at home and 

elevation of the legs she is lying down.  Return precautions provided and she 

is to follow-up with her primary care doctor next 2-3 days if symptoms are not 

improving with mechanical therapy. (BECKY BOSS)





- Vital Signs


Vital signs: 


 











Temp Pulse Resp BP Pulse Ox


 


 97.8 F   59 L  14   145/58 H  98 


 


 02/21/18 23:43  02/21/18 23:43  02/21/18 23:43  02/21/18 23:43  02/21/18 23:43














- Laboratory


Laboratory results interpreted by me: 


 











  02/21/18 02/21/18





  19:30 19:30


 


Hct  34.9 L 


 


RDW  14.4 H 


 


Carbon Dioxide   31 H


 


BUN   29 H


 


Est GFR (Non-Af Amer)   50 L














Discharge





<LAUREN FLETCHER - Last Filed: 02/21/18 23:16>





<BECKY BOSS - Last Filed: 02/22/18 03:48>





- Discharge


Clinical Impression: 


 Pitting edema





Condition: Stable


Disposition: HOME, SELF-CARE


Additional Instructions: 








As per our discussion please use the compression stockings you already have at 

home to help with the swelling.  When laying down make sure to elevate your 

feet at 20- 30. 





If you have any new or worsening symptoms please return to the emergency 

department.


Referrals: 


MAYNOR ARENAS MD [Primary Care Provider] - Follow up as needed


Scribe Attestation: 





02/22/18 03:48


I personally performed the services described documentation, reviewed and 

edited the documentation which was dictated to describe my presence, and it 

accurately records my words and actions. (BECKY BOSS)





Scribe Documentation





- Scribe


Written by Scribe:: Joseph Arrington, 2/21/2018 22:02


acting as scribe for :: Neal





<LAUREN FLETCHER - Last Filed: 02/21/18 23:16>

## 2018-02-22 NOTE — EKG REPORT
SEVERITY:- ABNORMAL ECG -

SINUS RHYTHM

RIGHT BUNDLE BRANCH BLOCK

:

Confirmed by: Shadi Steele MD 22-Feb-2018 08:03:05